# Patient Record
Sex: MALE | Race: WHITE | NOT HISPANIC OR LATINO | Employment: FULL TIME | URBAN - METROPOLITAN AREA
[De-identification: names, ages, dates, MRNs, and addresses within clinical notes are randomized per-mention and may not be internally consistent; named-entity substitution may affect disease eponyms.]

---

## 2017-12-11 ENCOUNTER — OFFICE VISIT (OUTPATIENT)
Dept: FAMILY MEDICINE | Facility: CLINIC | Age: 32
End: 2017-12-11
Payer: MEDICAID

## 2017-12-11 VITALS
TEMPERATURE: 96.6 F | BODY MASS INDEX: 30.21 KG/M2 | DIASTOLIC BLOOD PRESSURE: 77 MMHG | SYSTOLIC BLOOD PRESSURE: 118 MMHG | WEIGHT: 211 LBS | HEIGHT: 70 IN | OXYGEN SATURATION: 97 % | HEART RATE: 65 BPM

## 2017-12-11 DIAGNOSIS — Z00.00 ENCOUNTER FOR ROUTINE ADULT HEALTH EXAMINATION WITHOUT ABNORMAL FINDINGS: Primary | ICD-10-CM

## 2017-12-11 DIAGNOSIS — Z83.49 FAMILY HISTORY OF HYPERTHYROIDISM: ICD-10-CM

## 2017-12-11 DIAGNOSIS — Z11.3 SCREEN FOR STD (SEXUALLY TRANSMITTED DISEASE): ICD-10-CM

## 2017-12-11 LAB
ALBUMIN SERPL-MCNC: 4.2 G/DL (ref 3.4–5)
ALP SERPL-CCNC: 67 U/L (ref 40–150)
ALT SERPL W P-5'-P-CCNC: 28 U/L (ref 0–70)
ANION GAP SERPL CALCULATED.3IONS-SCNC: 10 MMOL/L (ref 3–14)
AST SERPL W P-5'-P-CCNC: 20 U/L (ref 0–45)
BILIRUB SERPL-MCNC: 0.6 MG/DL (ref 0.2–1.3)
BUN SERPL-MCNC: 14 MG/DL (ref 7–30)
CALCIUM SERPL-MCNC: 9 MG/DL (ref 8.5–10.1)
CHLORIDE SERPL-SCNC: 106 MMOL/L (ref 94–109)
CHOLEST SERPL-MCNC: 169 MG/DL
CO2 SERPL-SCNC: 23 MMOL/L (ref 20–32)
CREAT SERPL-MCNC: 0.93 MG/DL (ref 0.66–1.25)
ERYTHROCYTE [DISTWIDTH] IN BLOOD BY AUTOMATED COUNT: 13.8 % (ref 10–15)
GFR SERPL CREATININE-BSD FRML MDRD: >90 ML/MIN/1.7M2
GLUCOSE SERPL-MCNC: 96 MG/DL (ref 70–99)
HCT VFR BLD AUTO: 44.1 % (ref 40–53)
HDLC SERPL-MCNC: 58 MG/DL
HGB BLD-MCNC: 15.1 G/DL (ref 13.3–17.7)
HIV 1+2 AB+HIV1 P24 AG SERPL QL IA: NONREACTIVE
LDLC SERPL CALC-MCNC: 83 MG/DL
MCH RBC QN AUTO: 29.9 PG (ref 26.5–33)
MCHC RBC AUTO-ENTMCNC: 34.2 G/DL (ref 31.5–36.5)
MCV RBC AUTO: 87 FL (ref 78–100)
NONHDLC SERPL-MCNC: 111 MG/DL
PLATELET # BLD AUTO: 315 10E9/L (ref 150–450)
POTASSIUM SERPL-SCNC: 4.1 MMOL/L (ref 3.4–5.3)
PROT SERPL-MCNC: 7.8 G/DL (ref 6.8–8.8)
RBC # BLD AUTO: 5.05 10E12/L (ref 4.4–5.9)
SODIUM SERPL-SCNC: 139 MMOL/L (ref 133–144)
TRIGL SERPL-MCNC: 142 MG/DL
TSH SERPL DL<=0.005 MIU/L-ACNC: 0.76 MU/L (ref 0.4–4)
WBC # BLD AUTO: 7.5 10E9/L (ref 4–11)

## 2017-12-11 PROCEDURE — 87491 CHLMYD TRACH DNA AMP PROBE: CPT | Performed by: PHYSICIAN ASSISTANT

## 2017-12-11 PROCEDURE — 36415 COLL VENOUS BLD VENIPUNCTURE: CPT | Performed by: PHYSICIAN ASSISTANT

## 2017-12-11 PROCEDURE — 99385 PREV VISIT NEW AGE 18-39: CPT | Performed by: PHYSICIAN ASSISTANT

## 2017-12-11 PROCEDURE — 80053 COMPREHEN METABOLIC PANEL: CPT | Performed by: PHYSICIAN ASSISTANT

## 2017-12-11 PROCEDURE — 84443 ASSAY THYROID STIM HORMONE: CPT | Performed by: PHYSICIAN ASSISTANT

## 2017-12-11 PROCEDURE — 87389 HIV-1 AG W/HIV-1&-2 AB AG IA: CPT | Performed by: PHYSICIAN ASSISTANT

## 2017-12-11 PROCEDURE — 85027 COMPLETE CBC AUTOMATED: CPT | Performed by: PHYSICIAN ASSISTANT

## 2017-12-11 PROCEDURE — 80061 LIPID PANEL: CPT | Performed by: PHYSICIAN ASSISTANT

## 2017-12-11 PROCEDURE — 87591 N.GONORRHOEAE DNA AMP PROB: CPT | Performed by: PHYSICIAN ASSISTANT

## 2017-12-11 RX ORDER — MULTIPLE VITAMINS W/ MINERALS TAB 9MG-400MCG
1 TAB ORAL DAILY
COMMUNITY

## 2017-12-11 NOTE — NURSING NOTE
"Chief Complaint   Patient presents with     Physical       Initial /77 (Cuff Size: Adult Large)  Pulse 65  Temp 96.6  F (35.9  C) (Tympanic)  Ht 5' 10\" (1.778 m)  Wt 211 lb (95.7 kg)  SpO2 97%  BMI 30.28 kg/m2 Estimated body mass index is 30.28 kg/(m^2) as calculated from the following:    Height as of this encounter: 5' 10\" (1.778 m).    Weight as of this encounter: 211 lb (95.7 kg).  Medication Reconciliation: complete    "

## 2017-12-11 NOTE — MR AVS SNAPSHOT
After Visit Summary   12/11/2017    Suhas Hernandez    MRN: [id number]           Patient Information     Date Of Birth          1985        Visit Information        Provider Department      12/11/2017 9:30 AM Marge Brock PA-C [facility]        Today's Diagnoses     Family history of hyperthyroidism    -  1    Encounter for routine adult health examination without abnormal findings        Screen for STD (sexually transmitted disease)          Care Instructions      Preventive Health Recommendations  Male Ages 26 - 39    Yearly exam:             See your health care provider every year in order to  o   Review health changes.   o   Discuss preventive care.    o   Review your medicines if your doctor has prescribed any.    You should be tested each year for STDs (sexually transmitted diseases), if you re at risk.     After age 35, talk to your provider about cholesterol testing. If you are at risk for heart disease, have your cholesterol tested at least every 5 years.     If you are at risk for diabetes, you should have a diabetes test (fasting glucose).  Shots: Get a flu shot each year. Get a tetanus shot every 10 years.     Nutrition:    Eat at least 5 servings of fruits and vegetables daily.     Eat whole-grain bread, whole-wheat pasta and brown rice instead of white grains and rice.     Talk to your provider about Calcium and Vitamin D.     Lifestyle    Exercise for at least 150 minutes a week (30 minutes a day, 5 days a week). This will help you control your weight and prevent disease.     Limit alcohol to one drink per day.     No smoking.     Wear sunscreen to prevent skin cancer.     See your dentist every six months for an exam and cleaning.             Follow-ups after your visit        Who to contact     If you have questions or need follow up information about today's clinic visit or your schedule please contact [facility] [place] directly at [phone].  Normal or  "non-critical lab and imaging results will be communicated to you by MyChart, letter or phone within 4 business days after the clinic has received the results. If you do not hear from us within 7 days, please contact the clinic through [employer] or phone. If you have a critical or abnormal lab result, we will notify you by phone as soon as possible.  Submit refill requests through [employer] or call your pharmacy and they will forward the refill request to us. Please allow 3 business days for your refill to be completed.          Additional Information About Your Visit        YatedoharOMEGA MORGAN Information     [facility] gives you secure access to your electronic health record. If you see a primary care provider, you can also send messages to your care team and make appointments. If you have questions, please call your primary care clinic.  If you do not have a primary care provider, please call [phone] and they will assist you.        Care EveryWhere ID     This is your Care EveryWhere ID. This could be used by other organizations to access your [facility] medical records  [id number]        Your Vitals Were     Pulse Temperature Height Pulse Oximetry BMI (Body Mass Index)       65 96.6  F (35.9  C) (Tympanic) 5' 10\" (1.778 m) 97% 30.28 kg/m2        Blood Pressure from Last 3 Encounters:   12/11/17 118/77   08/22/13 122/70   03/26/13 134/83    Weight from Last 3 Encounters:   12/11/17 211 lb (95.7 kg)   08/22/13 211 lb (95.7 kg)   03/26/13 218 lb 3.2 oz (99 kg)              We Performed the Following     CBC with platelets     Chlamydia trachomatis PCR     Comprehensive metabolic panel     HIV Antigen Antibody Combo     Lipid Profile     Neisseria gonorrhoeae PCR     TSH with free T4 reflex        Primary Care Provider Office Phone # Fax #    Marge Brock PA-C [phone] [address]   [place] [address]        Equal Access to Services     TA BURNETT AH: Hadii codi Amor " rosio sahamaadam goodmanjasmyneprimo medinanilton khoileandro krishna. So [facility] [phone].    ATENCIÓN: Si shmuel hickman, tiene a shah disposición servicios gratuitos de asistencia lingüística. Llame al [phone].    We comply with applicable federal civil rights laws and [place] laws. We do not discriminate on the basis of race, color, national origin, age, disability, sex, sexual orientation, or gender identity.            Thank you!     Thank you for choosing [facility]  for your care. Our goal is always to provide you with excellent care. Hearing back from our patients is one way we can continue to improve our services. Please take a few minutes to complete the written survey that you may receive in the mail after your visit with us. Thank you!             Your Updated Medication List - Protect others around you: Learn how to safely use, store and throw away your medicines at www.disposemymeds.org.          This list is accurate as of: 12/11/17  9:46 AM.  Always use your most recent med list.                   Brand Name Dispense Instructions for use Diagnosis    Multi-vitamin Tabs tablet      Take 1 tablet by mouth daily

## 2017-12-11 NOTE — PROGRESS NOTES
Answers for HPI/ROS submitted by the patient on 12/10/2017     SUBJECTIVE:   CC: Suhas Hernandez is an 32 year old male who presents for preventative health visit.     Declines flu shot today    Annual Exam:  Getting at least 3 servings of Calcium per day:: Yes  Bi-annual eye exam:: Yes  Dental care twice a year:: Yes  Sleep apnea or symptoms of sleep apnea:: None  Diet:: Low salt, Low fat/cholesterol  Frequency of exercise:: 4-5 days/week  Taking medications regularly:: Yes  Medication side effects:: None  Additional concerns today:: YES  PHQ-2 Score: 0  Duration of exercise:: 45-60 minutes        Today's PHQ-2 Score:   PHQ-2 ( 1999 [employer]) 12/10/2017 8/22/2013   Q1: Little interest or pleasure in doing things 0 0   Q2: Feeling down, depressed or hopeless 0 0   PHQ-2 Score 0 0   Q1: Little interest or pleasure in doing things Not at all -   Q2: Feeling down, depressed or hopeless Not at all -   PHQ-2 Score 0 -         Abuse: Current or Past(Physical, Sexual or Emotional)- No  Do you feel safe in your environment - Yes  Social History   Substance Use Topics     Smoking status: Never Smoker     Smokeless tobacco: Never Used     Alcohol use Yes      Comment: 1-2 times a month     The patient does not drink >3 drinks per day nor >7 drinks per week.    Past Medical History:   Diagnosis Date     Anxiety      Family history of hyperthyroidism 3/12/2012     Glaucoma      PTSD (post-traumatic stress disorder) 11/2010    [employer] volunteer in [employer] [facility].     Sweat, sweating, excessive 3/12/2012     Past Surgical History:   Procedure Laterality Date     MYRINGOTOMY, INSERT TUBE BILATERAL, COMBINED       Current Outpatient Prescriptions   Medication Sig Dispense Refill     multivitamin, therapeutic with minerals (MULTI-VITAMIN) TABS tablet Take 1 tablet by mouth daily         ROS:  C: NEGATIVE for fever, chills, change in weight  I: NEGATIVE for worrisome rashes, moles or lesions  E: NEGATIVE for vision changes or irritation  ENT:  "NEGATIVE for ear, mouth and throat problems  R: NEGATIVE for significant cough or SOB  CV: NEGATIVE for chest pain, palpitations or peripheral edema  GI: NEGATIVE for nausea, abdominal pain, heartburn, or change in bowel habits   male: negative for dysuria, hematuria, decreased urinary stream, erectile dysfunction, urethral discharge  M: NEGATIVE for significant arthralgias or myalgia  N: NEGATIVE for weakness, dizziness or paresthesias  P: NEGATIVE for changes in mood or affect    OBJECTIVE:   /77 (Cuff Size: Adult Large)  Pulse 65  Temp 96.6  F (35.9  C) (Tympanic)  Ht 5' 10\" (1.778 m)  Wt 211 lb (95.7 kg)  SpO2 97%  BMI 30.28 kg/m2  EXAM:  GENERAL: healthy, alert and no distress  EYES: Eyes grossly normal to inspection, PERRL and conjunctivae and sclerae normal  HENT: ear canals and TM's normal, nose and mouth without ulcers or lesions  NECK: no adenopathy, no asymmetry, masses, or scars and thyroid normal to palpation  RESP: lungs clear to auscultation - no rales, rhonchi or wheezes  CV: regular rate and rhythm, normal S1 S2, no S3 or S4, no murmur, click or rub, no peripheral edema and peripheral pulses strong  ABDOMEN: soft, nontender, no hepatosplenomegaly, no masses and bowel sounds normal  : no penile lesions. Testicular exam normal.  MS: no gross musculoskeletal defects noted, no edema  SKIN: no suspicious lesions or rashes  NEURO: Normal strength and tone, mentation intact and speech normal  PSYCH: mentation appears normal, affect normal/bright    ASSESSMENT/PLAN:   Assessment and Plan:     (Z83.49) Family history of hyperthyroidism  (primary encounter diagnosis)  Comment:   Plan: TSH with free T4 reflex            (Z00.00) Encounter for routine adult health examination without abnormal findings  Comment:   Plan: Comprehensive metabolic panel, CBC with         platelets, Lipid Profile            (Z11.3) Screen for STD (sexually transmitted disease)  Comment:   Plan: HIV Antigen Antibody " "Combo, Chlamydia         trachomatis PCR, Neisseria gonorrhoeae PCR                COUNSELING:  Reviewed preventive health counseling, as reflected in patient instructions         reports that he has never smoked. He has never used smokeless tobacco.      Estimated body mass index is 30.28 kg/(m^2) as calculated from the following:    Height as of this encounter: 5' 10\" (1.778 m).    Weight as of this encounter: 211 lb (95.7 kg).       Counseling Resources:  ATP IV Guidelines  Pooled Cohorts Equation Calculator  FRAX Risk Assessment  ICSI Preventive Guidelines  Dietary Guidelines for Americans, 2010  USDA's MyPlate  ASA Prophylaxis  Lung CA Screening    Marge Brock PA-C  [facility]  "

## 2017-12-12 LAB
C TRACH DNA SPEC QL NAA+PROBE: NEGATIVE
N GONORRHOEA DNA SPEC QL NAA+PROBE: NEGATIVE
SPECIMEN SOURCE: NORMAL
SPECIMEN SOURCE: NORMAL

## 2017-12-12 NOTE — PROGRESS NOTES
It was a pleasure seeing you for your physical examination.  I wanted to get back to you with your test results.  I have enclosed a copy for your review.      I am happy to report that your CBC or complete blood count is normal with no signs of anemia, leukemia or platelet abnormalities. Your chemistry panel shows no signs of diabetes.  Your blood salts, kidney tests, liver tests, and proteins are all fine.    Your thyroid test was in normal range.  Your HIV, gonorrhea and chlamydia tests were all negative.    Your total cholesterol is 169 with the normal range being below 200.  Your HDL or good cholesterol is 58 with the normal range being above 40.  Your LDL or bad cholesterol is 83 with the normal range being below 160.  This looks great!    Let me know if you have any questions.    Marge Brock PA-C

## 2018-09-24 ENCOUNTER — OFFICE VISIT (OUTPATIENT)
Dept: NUTRITION | Facility: CLINIC | Age: 33
End: 2018-09-24
Payer: COMMERCIAL

## 2018-09-24 VITALS — HEIGHT: 68 IN | WEIGHT: 225.1 LBS | BODY MASS INDEX: 34.11 KG/M2

## 2018-09-24 DIAGNOSIS — K21.9 GASTROESOPHAGEAL REFLUX DISEASE, ESOPHAGITIS PRESENCE NOT SPECIFIED: ICD-10-CM

## 2018-09-24 DIAGNOSIS — E66.811 CLASS 1 OBESITY WITHOUT SERIOUS COMORBIDITY WITH BODY MASS INDEX (BMI) OF 34.0 TO 34.9 IN ADULT, UNSPECIFIED OBESITY TYPE: Primary | ICD-10-CM

## 2018-09-24 PROCEDURE — 97802 MEDICAL NUTRITION INDIV IN: CPT

## 2018-09-24 NOTE — PATIENT INSTRUCTIONS
Reminders:  1) BMI of 24 = body weight of ~ 160#  2) Consider tracking calorie intake for awhile like myfitnesspal  3) Check A1C  4) Consider the plant based meal plan for 3-4 weeks

## 2018-09-24 NOTE — MR AVS SNAPSHOT
After Visit Summary   9/24/2018    Suhas Hernandez    MRN: [id number]           Patient Information     Date Of Birth          1985        Visit Information        Provider Department      9/24/2018 2:30 PM  [facility]        Care Instructions    Reminders:  1) BMI of 24 = body weight of ~ 160#  2) Consider tracking calorie intake for awhile like myfitnesspal  3) Check A1C  4) Consider the plant based meal plan for 3-4 weeks            Follow-ups after your visit        Your next 10 appointments already scheduled     Oct 22, 2018  2:30 PM CDT   Office Visit with [facility] ([facility])    [address] [address] [phone]           Bring a current list of meds and any records pertaining to this visit. For Physicals, please bring immunization records and any forms needing to be filled out. Please arrive 10 minutes early to complete paperwork.              Who to contact     If you have questions or need follow up information about today's clinic visit or your schedule please contact [facility] directly at [phone].  Normal or non-critical lab and imaging results will be communicated to you by MyChart, letter or phone within 4 business days after the clinic has received the results. If you do not hear from us within 7 days, please contact the clinic through [employer] or phone. If you have a critical or abnormal lab result, we will notify you by phone as soon as possible.  Submit refill requests through [employer] or call your pharmacy and they will forward the refill request to us. Please allow 3 business days for your refill to be completed.          Additional Information About Your Visit        MyChart Information     [employer] gives you secure access to your electronic health record. If you see a primary care provider, you can  also send messages to your care team and make appointments. If you have questions, please call your primary care clinic.  If you do not have a primary care provider, please call [phone] and they will assist you.        Care EveryWhere ID     This is your Care EveryWhere ID. This could be used by other organizations to access your [facility] medical records  [id number]         Blood Pressure from Last 3 Encounters:   12/11/17 118/77   08/22/13 122/70   03/26/13 134/83    Weight from Last 3 Encounters:   12/11/17 95.7 kg (211 lb)   08/22/13 95.7 kg (211 lb)   03/26/13 99 kg (218 lb 3.2 oz)              Today, you had the following     No orders found for display       Primary Care Provider Office Phone # Fax #    Marge Brock PA-C [phone] [address]   [place] [address]        Equal Access to Services     [facility]: Hadii aad ku hadasho Soomaali, waaxda luqadaha, qaybta kaalmada adeegyada, waxay idiin hayaan adeeg londonaravinay la'yang . So [facility] [phone].    ATENCIÓN: Si habla español, tiene a shah disposición servicios gratuitos de asistencia lingüística. Llame al [phone].    We comply with applicable federal civil rights laws and [place] laws. We do not discriminate on the basis of race, color, national origin, age, disability, sex, sexual orientation, or gender identity.            Thank you!     Thank you for choosing [facility]  for your care. Our goal is always to provide you with excellent care. Hearing back from our patients is one way we can continue to improve our services. Please take a few minutes to complete the written survey that you may receive in the mail after your visit with us. Thank you!             Your Updated Medication List - Protect others around you: Learn how to safely use, store and throw away your medicines at www.disposemymeds.org.          This list is accurate as of 9/24/18  4:03 PM.  Always use your most recent  med list.                   Brand Name Dispense Instructions for use Diagnosis    Multi-vitamin Tabs tablet      Take 1 tablet by mouth daily

## 2018-09-24 NOTE — PROGRESS NOTES
"Medical Nutrition Therapy  Visit Type:Initial assessment and intervention    Suhas Hernandez presents today for MNT and education related to weight management and wants diabetes prevention with family history on both of sides of family in grandparents. No diabetes in parents yet. Mom is a triathlete (she has hypothyroid). Both parents healthy weight. Mom sometimes up and down with weight.  He is accompanied by self.     ASSESSMENT:   Patient comments/concerns relating to nutrition: aware of risk of DB with family history, overweight. Has been to  for 1.5 years to improve health- and has lost #15 in that time, not satisfied with loss. Wonders if something is missing, is he overlooking something.   Questions- goal weight? 160# for BMI of 24, not overweight. Discussed realistic goal, move left on the BMI chart.   BMI 34 today.   Could I have food allergies (that cause weight gain or difficulty losing)? Probably would know from sx    NUTRITION HISTORY:  Angelo finds eating sugar to be \"agitating\", makes him uncomfortable. Rarely has cheese or dairy, uses almond milk. Reports some dairy intolerance.  \"I try to eat as little carb as possible, be smart about portion size\" Not eating \"extracurricular sugar\".  Keeps food record at times for .  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  Kind of eats by the clock, [place] when there was a tight schedule.Results in eating when not hungry.           Beverages: Water 100 oz/day water, coffee with cream and agave or stevia     Misses meals? no  Eats out:  2 meals/per week     Previous diet education:  No     Food allergies/intolerances: dairy intolerance by report    Diet does not appear excessive in calories, appears well balanced with produce, protein, starch at most meals.    EXERCISE: moderate regular exercise program which includes personal training 3 days per week: individual and class " sessions. Mostly weight training, resistance.  He feels stronger, but not much change in fat %.    SOCIO/ECONOMIC:   Lives with: self    MEDICATIONS:  Current Outpatient Prescriptions   Medication     multivitamin, therapeutic with minerals (MULTI-VITAMIN) TABS tablet     No current facility-administered medications for this visit.      Multi contains 800 international unit  Vitamin D3    LABS:  Last Basic Metabolic Panel:  Lab Results   Component Value Date     12/11/2017      Lab Results   Component Value Date    POTASSIUM 4.1 12/11/2017     Lab Results   Component Value Date    CHLORIDE 106 12/11/2017     Lab Results   Component Value Date    LEONIDAS 9.0 12/11/2017     Lab Results   Component Value Date    CO2 23 12/11/2017     Lab Results   Component Value Date    BUN 14 12/11/2017     Lab Results   Component Value Date    CR 0.93 12/11/2017     Lab Results   Component Value Date    GLC 96 12/11/2017       ANTHROPOMETRICS:  Vitals: There were no vitals taken for this visit.  There is no height or weight on file to calculate BMI.      Wt Readings from Last 5 Encounters:   12/11/17 95.7 kg (211 lb)   08/22/13 95.7 kg (211 lb)   03/26/13 99 kg (218 lb 3.2 oz)   03/07/13 97.2 kg (214 lb 4.8 oz)   03/12/12 101.9 kg (224 lb 11.2 oz)       Weight Change: reports loss of 15# in 1.5 years, down from 240#    ESTIMATED KCAL REQUIREMENTS:  Not calculated today     NUTRITION DIAGNOSIS: Overweight/Obesity related to dietary choices, perhaps some insulin resistance as evidenced by weight loss of < 1#/month in past 1.5 years despite purposeful activity and healthful eating    NUTRITION INTERVENTION:  Education given to support: weight reduction: Label Reading reviewed for finding fat grams, use of weight loss markus to track intake and make adjustments, and plant based eating as a strategy.  Angelo expresses he is very interested in trying a plant-based approach. Instructed on research around low-fast vegan meal plan and daily  "diet, also variations on plant-based like VB6  Education Materials Provided: PCRM graphic, \"How To\" guide for plant based eating, 21 day vegan Allison aguero markus, [employer] veg starter kit    PATIENT'S BEHAVIOR CHANGE GOALS:   See Patient Instructions for patient stated behavior change goals. AVS was printed and given to patient at today's appointment.    MONITOR / EVALUATE:  RD will monitor/evaluate:  Pertinent Labs  Progress toward meeting stated nutrition-related goals  Weight change    FOLLOW-UP:  Call RD with questions/concerns.   Follow-up appointment scheduled on 10/22.     Rashmi Ford RD, LD, CDE    Time spent in minutes: 75  Encounter: Individual Nutrition    ADDEND:  Reply from provider:      A1C ordered per PCP approval.   Alerted Suhas that he may come in for lab.    Rashmi Ford RD, LD, CDE    "

## 2018-09-27 ENCOUNTER — PATIENT OUTREACH (OUTPATIENT)
Dept: EDUCATION SERVICES | Facility: CLINIC | Age: 33
End: 2018-09-27

## 2018-09-27 DIAGNOSIS — E66.9 OBESITY: Primary | ICD-10-CM

## 2018-09-27 NOTE — PROGRESS NOTES
Email from Suhas:  Ramón Borrero,     Thanks so much for providing me with information regarding a plant-based diet and   how it can help me reach my goals.   I am happy to report that I am scoring a 100% success following the diet. No meat, dairy or other animal-based  product. The hardest part has been skipping creamer in my coffee. It will take a while to get used to that.   I'm finding that sparkling water like [place] or water with fresh lemon juice might be a good substitute if   I can't get over the almond milk creamer.     I'm wondering if the A1C test was scheduled? I am traveling right now, so I am hoping that I can come in next Thursday, Oct. 4 in the afternoon for   the test?  Thanks again.     --   Suhas Hernandez    CDE reply:  Ramón Dai,     Wow, good for you! It s great to hear you are giving the plant-based strategy a try to see if that works for you J   The plant-based creamers can be tricky, not quite as thick as the real thing. You are right on track thinking about alternatives or other options!    Yes, I did hear back from Marge and she was on board with the A1C, so that is now ordered and you can come in any time to check. Again, it is not a fasting lab so any time of day is fine.    I m excited to hear what you think about the meal plan when we reconnect!   Please do let me know if particular questions come up before the 22nd.    Rashmi Ford RD, LD, CDE

## 2018-10-05 DIAGNOSIS — E66.811 CLASS 1 OBESITY WITHOUT SERIOUS COMORBIDITY WITH BODY MASS INDEX (BMI) OF 34.0 TO 34.9 IN ADULT, UNSPECIFIED OBESITY TYPE: ICD-10-CM

## 2018-10-05 LAB — HBA1C MFR BLD: 5.5 % (ref 0–5.6)

## 2018-10-05 PROCEDURE — 36415 COLL VENOUS BLD VENIPUNCTURE: CPT | Performed by: PHYSICIAN ASSISTANT

## 2018-10-05 PROCEDURE — 83036 HEMOGLOBIN GLYCOSYLATED A1C: CPT | Performed by: PHYSICIAN ASSISTANT

## 2018-10-08 ENCOUNTER — PATIENT OUTREACH (OUTPATIENT)
Dept: EDUCATION SERVICES | Facility: CLINIC | Age: 33
End: 2018-10-08

## 2018-10-08 NOTE — PROGRESS NOTES
Suhas emailed:    Ramón Borrero,     I wanted to let you know I had my A1C test last Friday, 10/5.     Also, one last question about the vegan diet. I have noticed that my appetite has decreased significantly  since starting this diet. Is that normal?    RD reply:  Hi there,   Thanks for letting me know! I see the A1C result at 5.5%, the diagnostic for pre-diabetes is 5.7%, so you are in a good position to work on reducing that ;)     Regarding the appetite, it is not unusual to feel less hungry if your meal plan is higher in fiber than before.  It s fairly typical on the plant-based meal plan where you might be eating more fruit, veggies, whole grains, and beans- all quite high in fiber.   You were already doing well with eating the produce and some whole grains, but the meats, cheese, egg that were part of your diet before don t have any fiber, so replacing them with beans, lentils, some of the veggie protein with fiber can be more filling. That is believed to be one of the factors that results in weight loss or lower body weight in those eating plant-based.     That s the first thing that comes to mind, the fiber connection. We spoke a little before about  what your body likes or wants , sometimes it s not so much about how much you eat as what you eat.  Perhaps it s just more satisfying for you?    It would be great if you can keep a 3-4 day food record again before we meet up on the 22nd just to make sure you are getting enough calories and good balance of nutrients too J     I m excited to hear what you think and re-connect!  I will be out of town next week, but please be in touch if you do have other questions and one of the others on the team can help out.     [place],   Gissell Ford RD, LD, CDE

## 2018-10-22 ENCOUNTER — OFFICE VISIT (OUTPATIENT)
Dept: NUTRITION | Facility: CLINIC | Age: 33
End: 2018-10-22
Payer: COMMERCIAL

## 2018-10-22 VITALS — BODY MASS INDEX: 33.01 KG/M2 | WEIGHT: 217.1 LBS

## 2018-10-22 DIAGNOSIS — E66.9 OBESITY: Primary | ICD-10-CM

## 2018-10-22 PROCEDURE — 97803 MED NUTRITION INDIV SUBSEQ: CPT

## 2018-10-22 NOTE — PATIENT INSTRUCTIONS
Reminders:  1) Aim for 60-70 grams protein/day  2) Remember to choose some protein at each meal  3) keep taking the vitamin daily

## 2018-10-22 NOTE — MR AVS SNAPSHOT
After Visit Summary   10/22/2018    Suhas Hernandez    MRN: [id number]           Patient Information     Date Of Birth          1985        Visit Information        Provider Department      10/22/2018 2:30 PM  NUTRITION [facility]        Care Instructions    Reminders:  1) Aim for 60-70 grams protein/day  2) Remember to choose some protein at each meal  3) keep taking the vitamin daily            Follow-ups after your visit        Your next 10 appointments already scheduled     Nov 20, 2018  2:30 PM CST   Office Visit with [facility] ([facility])    [address] [id number] [phone]           Bring a current list of meds and any records pertaining to this visit. For Physicals, please bring immunization records and any forms needing to be filled out. Please arrive 10 minutes early to complete paperwork.              Who to contact     If you have questions or need follow up information about today's clinic visit or your schedule please contact [facility] directly at [phone].  Normal or non-critical lab and imaging results will be communicated to you by viviohart, letter or phone within 4 business days after the clinic has received the results. If you do not hear from us within 7 days, please contact the clinic through Pharmlyt or phone. If you have a critical or abnormal lab result, we will notify you by phone as soon as possible.  Submit refill requests through basno or call your pharmacy and they will forward the refill request to us. Please allow 3 business days for your refill to be completed.          Additional Information About Your Visit        viviohart Information     basno gives you secure access to your electronic health record. If you see a primary care provider, you can also send messages to your care team and make  appointments. If you have questions, please call your primary care clinic.  If you do not have a primary care provider, please call [phone] and they will assist you.        Care EveryWhere ID     This is your Care EveryWhere ID. This could be used by other organizations to access your [place] medical records  [id number]         Blood Pressure from Last 3 Encounters:   12/11/17 118/77   08/22/13 122/70   03/26/13 134/83    Weight from Last 3 Encounters:   09/24/18 102.1 kg (225 lb 1.6 oz)   12/11/17 95.7 kg (211 lb)   08/22/13 95.7 kg (211 lb)              Today, you had the following     No orders found for display       Primary Care Provider Office Phone # Fax #    Marge Brock PA-C [phone] [address]   [place] [address]        Equal Access to Services     PORTIA BURNETT : Hadii aad ku hadasho Soomaali, waaxda luqadaha, qaybta kaalmada adeegyada, waxay khoiin haymaryannn rex borja . So [facility] [phone].    ATENCIÓN: Si habla español, tiene a shah disposición servicios gratuitos de asistencia lingüística. Llame al [phone].    We comply with applicable federal civil rights laws and [place] laws. We do not discriminate on the basis of race, color, national origin, age, disability, sex, sexual orientation, or gender identity.            Thank you!     Thank you for choosing [facility]  for your care. Our goal is always to provide you with excellent care. Hearing back from our patients is one way we can continue to improve our services. Please take a few minutes to complete the written survey that you may receive in the mail after your visit with us. Thank you!             Your Updated Medication List - Protect others around you: Learn how to safely use, store and throw away your medicines at www.disposemymeds.org.          This list is accurate as of 10/22/18  3:14 PM.  Always use your most recent med list.                   Brand Name  Dispense Instructions for use Diagnosis    Multi-vitamin Tabs tablet      Take 1 tablet by mouth daily

## 2018-10-22 NOTE — PROGRESS NOTES
"Medical Nutrition Therapy  Visit Type:Reassessment and intervention    Suhas Hernandez presents today for MNT and education related to weight management and diabetes prevention.   He is accompanied by self.     ASSESSMENT:   Patient comments/concerns relating to nutrition: Suhas reports a strong family history of T2D and he would like to avoid diabetes. He has been working out for 1.5 years and lost 15# in effort to improve health.  A1C of 10/05/18= 5.5%  Discussed weight loss strategies at last visit 1 month ago and Suhas has chosen to follow a low-fat plant-based meal plan (recommended for diabetes treatment).   He has lot 7# in the past month on a low-fat vegan diet. BMI now at 33.   Suhas describes it has been tricky to avoid all dairy, \"it's in everything\" but doing very well. Says he is following \"95% vegan\"- occasionally gets a salad that has some shredded cheese on it.   He describes that he has always been in the habit of eating the same foods, \"I don't every crave something\".   Reports decreased appetite on the plant-based plan, just not as hungry and food seems to last longer. Reviewed impact of higher fiber eating.   He continues to experiment with different foods, had an \"impossible burger\" recently and liked it well. He travels a lot, but has done well finding options he likes dining out.     NUTRITION HISTORY:  2 days of food records show inconsistent protein intake.   Breakfast: almost always oatmeal and fruit with coffee- almond creamer (Or Luis granola bar and fruit OR bagel and PB rarely)  Lunch: salad, veggies and wheat croutons with fat free dressing +/- lentil soup  Dinner: pad Tajik with tofu OR wheat pasta with marinara   Snacks: fruit cup or nuts or chips and salsa    Beverages: Coffee 1/day and Water 90 oz/day    Misses meals? no  Eats out:  A few meals/per week     Previous diet education:  Yes on 9/24 at initial visit    Food allergies/intolerances: dairy by report    EXERCISE: " moderate regular exercise program, see prior note    SOCIO/ECONOMIC:   Lives with: self    MEDICATIONS:  Current Outpatient Prescriptions   Medication     multivitamin, therapeutic with minerals (MULTI-VITAMIN) TABS tablet     No current facility-administered medications for this visit.        LABS:  Last Basic Metabolic Panel:  Lab Results   Component Value Date     12/11/2017      Lab Results   Component Value Date    POTASSIUM 4.1 12/11/2017     Lab Results   Component Value Date    CHLORIDE 106 12/11/2017     Lab Results   Component Value Date    LEONIDAS 9.0 12/11/2017     Lab Results   Component Value Date    CO2 23 12/11/2017     Lab Results   Component Value Date    BUN 14 12/11/2017     Lab Results   Component Value Date    CR 0.93 12/11/2017     Lab Results   Component Value Date    GLC 96 12/11/2017       ANTHROPOMETRICS:  Vitals: Wt 98.5 kg (217 lb 1.6 oz)  BMI 33.01 kg/m2  There is no height or weight on file to calculate BMI.      Wt Readings from Last 5 Encounters:   09/24/18 102.1 kg (225 lb 1.6 oz)   12/11/17 95.7 kg (211 lb)   08/22/13 95.7 kg (211 lb)   03/26/13 99 kg (218 lb 3.2 oz)   03/07/13 97.2 kg (214 lb 4.8 oz)       Weight Change: 7# loss    NUTRITION DIAGNOSIS: Overweight/Obesity related to previous food choices/intake as evidenced by 7# weight loss on plant-based diet    NUTRITION INTERVENTION:  Education given to support: low-fat plant-based meal plan and getting adequate protein of 60-70 grams/day   Education Materials Provided: list of plant protein foods    PATIENT'S BEHAVIOR CHANGE GOALS:   See Patient Instructions for patient stated behavior change goals. AVS was printed and given to patient at today's appointment.    MONITOR / EVALUATE:  RD will monitor/evaluate:  Progress toward meeting stated nutrition-related goals  Weight change    FOLLOW-UP:  Follow-up appointment scheduled on 11/19.     Rashmi Fodr RD, LD, CDE    Time spent in minutes: 25  Encounter: Individual

## 2018-12-15 ASSESSMENT — PATIENT HEALTH QUESTIONNAIRE - PHQ9
SUM OF ALL RESPONSES TO PHQ QUESTIONS 1-9: 10
SUM OF ALL RESPONSES TO PHQ QUESTIONS 1-9: 10
10. IF YOU CHECKED OFF ANY PROBLEMS, HOW DIFFICULT HAVE THESE PROBLEMS MADE IT FOR YOU TO DO YOUR WORK, TAKE CARE OF THINGS AT HOME, OR GET ALONG WITH OTHER PEOPLE: VERY DIFFICULT

## 2018-12-17 ENCOUNTER — PATIENT OUTREACH (OUTPATIENT)
Dept: EDUCATION SERVICES | Facility: CLINIC | Age: 33
End: 2018-12-17

## 2018-12-17 DIAGNOSIS — E66.9 OBESITY: Primary | ICD-10-CM

## 2018-12-17 NOTE — PROGRESS NOTES
"Reached out to Suhas, his most recent appointment had to be cancelled and he has not rescheduled. He has been \"travelling about 80% of the last month\" and not able to come in.    He says things are going well with the plant-based meal plan, \"I'm sticking with it\" and tells me his weight is now 212#, down 5 more from when we last connected.     He is pleased, but does describe some decreased energy at the gym, says maybe he needs to cut down a little to shorter workouts.     Reviewed (as discussed at last visit) be sure to get enough protein on the balanced plate, he feels he is doing well with that.   He does continue to take daily vitamins.     Suhas is scheduled for a physical later this week, will let me know if needs arise that I can assist with.    Rashmi Ford RD, LD, CDE    "

## 2018-12-20 ENCOUNTER — OFFICE VISIT (OUTPATIENT)
Dept: FAMILY MEDICINE | Facility: CLINIC | Age: 33
End: 2018-12-20
Payer: COMMERCIAL

## 2018-12-20 VITALS
BODY MASS INDEX: 32.89 KG/M2 | HEART RATE: 81 BPM | HEIGHT: 68 IN | DIASTOLIC BLOOD PRESSURE: 80 MMHG | OXYGEN SATURATION: 95 % | TEMPERATURE: 97.4 F | SYSTOLIC BLOOD PRESSURE: 113 MMHG | WEIGHT: 217 LBS

## 2018-12-20 DIAGNOSIS — Z23 NEED FOR VACCINATION: ICD-10-CM

## 2018-12-20 DIAGNOSIS — Z00.00 ROUTINE GENERAL MEDICAL EXAMINATION AT A HEALTH CARE FACILITY: Primary | ICD-10-CM

## 2018-12-20 DIAGNOSIS — Z11.3 SCREEN FOR STD (SEXUALLY TRANSMITTED DISEASE): ICD-10-CM

## 2018-12-20 DIAGNOSIS — F41.9 ANXIETY: ICD-10-CM

## 2018-12-20 DIAGNOSIS — Z23 NEED FOR PROPHYLACTIC VACCINATION AND INOCULATION AGAINST INFLUENZA: ICD-10-CM

## 2018-12-20 DIAGNOSIS — F32.0 MILD MAJOR DEPRESSION (H): ICD-10-CM

## 2018-12-20 LAB
ERYTHROCYTE [DISTWIDTH] IN BLOOD BY AUTOMATED COUNT: 13.6 % (ref 10–15)
HCT VFR BLD AUTO: 43.7 % (ref 40–53)
HGB BLD-MCNC: 14.6 G/DL (ref 13.3–17.7)
MCH RBC QN AUTO: 29.2 PG (ref 26.5–33)
MCHC RBC AUTO-ENTMCNC: 33.4 G/DL (ref 31.5–36.5)
MCV RBC AUTO: 87 FL (ref 78–100)
PLATELET # BLD AUTO: 336 10E9/L (ref 150–450)
RBC # BLD AUTO: 5 10E12/L (ref 4.4–5.9)
WBC # BLD AUTO: 6.9 10E9/L (ref 4–11)

## 2018-12-20 PROCEDURE — 90714 TD VACC NO PRESV 7 YRS+ IM: CPT | Performed by: PHYSICIAN ASSISTANT

## 2018-12-20 PROCEDURE — 87389 HIV-1 AG W/HIV-1&-2 AB AG IA: CPT | Performed by: PHYSICIAN ASSISTANT

## 2018-12-20 PROCEDURE — 99395 PREV VISIT EST AGE 18-39: CPT | Mod: 25 | Performed by: PHYSICIAN ASSISTANT

## 2018-12-20 PROCEDURE — 90471 IMMUNIZATION ADMIN: CPT | Performed by: PHYSICIAN ASSISTANT

## 2018-12-20 PROCEDURE — 90686 IIV4 VACC NO PRSV 0.5 ML IM: CPT | Performed by: PHYSICIAN ASSISTANT

## 2018-12-20 PROCEDURE — 36415 COLL VENOUS BLD VENIPUNCTURE: CPT | Performed by: PHYSICIAN ASSISTANT

## 2018-12-20 PROCEDURE — 80053 COMPREHEN METABOLIC PANEL: CPT | Performed by: PHYSICIAN ASSISTANT

## 2018-12-20 PROCEDURE — 85027 COMPLETE CBC AUTOMATED: CPT | Performed by: PHYSICIAN ASSISTANT

## 2018-12-20 PROCEDURE — 90472 IMMUNIZATION ADMIN EACH ADD: CPT | Performed by: PHYSICIAN ASSISTANT

## 2018-12-20 RX ORDER — FAMOTIDINE 20 MG
TABLET ORAL DAILY
COMMUNITY
End: 2024-07-25

## 2018-12-20 ASSESSMENT — ANXIETY QUESTIONNAIRES
1. FEELING NERVOUS, ANXIOUS, OR ON EDGE: MORE THAN HALF THE DAYS
GAD7 TOTAL SCORE: 14
5. BEING SO RESTLESS THAT IT IS HARD TO SIT STILL: MORE THAN HALF THE DAYS
2. NOT BEING ABLE TO STOP OR CONTROL WORRYING: MORE THAN HALF THE DAYS
3. WORRYING TOO MUCH ABOUT DIFFERENT THINGS: MORE THAN HALF THE DAYS
7. FEELING AFRAID AS IF SOMETHING AWFUL MIGHT HAPPEN: MORE THAN HALF THE DAYS
6. BECOMING EASILY ANNOYED OR IRRITABLE: MORE THAN HALF THE DAYS
IF YOU CHECKED OFF ANY PROBLEMS ON THIS QUESTIONNAIRE, HOW DIFFICULT HAVE THESE PROBLEMS MADE IT FOR YOU TO DO YOUR WORK, TAKE CARE OF THINGS AT HOME, OR GET ALONG WITH OTHER PEOPLE: VERY DIFFICULT

## 2018-12-20 ASSESSMENT — PATIENT HEALTH QUESTIONNAIRE - PHQ9: 5. POOR APPETITE OR OVEREATING: MORE THAN HALF THE DAYS

## 2018-12-20 ASSESSMENT — MIFFLIN-ST. JEOR: SCORE: 1908.81

## 2018-12-20 NOTE — PROGRESS NOTES
Injectable Influenza Immunization Documentation    1.  Is the person to be vaccinated sick today?   No    2. Does the person to be vaccinated have an allergy to a component   of the vaccine?   No  Egg Allergy Algorithm Link    3. Has the person to be vaccinated ever had a serious reaction   to influenza vaccine in the past?   No    4. Has the person to be vaccinated ever had Guillain-Barré syndrome?   No    Form completed by patient    Prior to injection, verified patient identity using patient's name and date of birth.  Due to injection administration, patient instructed to remain in clinic for 15 minutes  afterwards, and to report any adverse reaction to me immediately.    Hortensia Du MA       Screening Questionnaire for Adult Immunization    Are you sick today?   No   Do you have allergies to medications, food, a vaccine component or latex?   No   Have you ever had a serious reaction after receiving a vaccination?   No   Do you have a long-term health problem with heart disease, lung disease, asthma, kidney disease, metabolic disease (e.g. diabetes), anemia, or other blood disorder?   No   Do you have cancer, leukemia, HIV/AIDS, or any other immune system problem?   No   In the past 3 months, have you taken medications that affect  your immune system, such as prednisone, other steroids, or anticancer drugs; drugs for the treatment of rheumatoid arthritis, Crohn s disease, or psoriasis; or have you had radiation treatments?   No   Have you had a seizure, or a brain or other nervous system problem?   No   During the past year, have you received a transfusion of blood or blood     products, or been given immune (gamma) globulin or antiviral drug?   No   For women: Are you pregnant or is there a chance you could become        pregnant during the next month?   No   Have you received any vaccinations in the past 4 weeks?   No     Immunization questionnaire answers were all negative.        Per orders of Marge Brock,  injection of Td given by Hortensia Du. Patient instructed to remain in clinic for 15 minutes afterwards, and to report any adverse reaction to me immediately.  Prior to injection, verified patient identity using patient's name and date of birth.  Due to injection administration, patient instructed to remain in clinic for 15 minutes  afterwards, and to report any adverse reaction to me immediately.         Screening performed by Hortensia Du on 12/20/2018 at 3:44 PM.

## 2018-12-20 NOTE — PATIENT INSTRUCTIONS
Preventive Health Recommendations  Male Ages 26 - 39    Yearly exam:             See your health care provider every year in order to  o   Review health changes.   o   Discuss preventive care.    o   Review your medicines if your doctor has prescribed any.    You should be tested each year for STDs (sexually transmitted diseases), if you re at risk.     After age 35, talk to your provider about cholesterol testing. If you are at risk for heart disease, have your cholesterol tested at least every 5 years.     If you are at risk for diabetes, you should have a diabetes test (fasting glucose).  Shots: Get a flu shot each year. Get a tetanus shot every 10 years.     Nutrition:    Eat at least 5 servings of fruits and vegetables daily.     Eat whole-grain bread, whole-wheat pasta and brown rice instead of white grains and rice.     Get adequate Calcium and Vitamin D.     Lifestyle    Exercise for at least 150 minutes a week (30 minutes a day, 5 days a week). This will help you control your weight and prevent disease.     Limit alcohol to one drink per day.     No smoking.     Wear sunscreen to prevent skin cancer.     See your dentist every six months for an exam and cleaning.     Zoloft start with 25mg (1/2 tab) for the first week, then increase to 50mg daily  Take in the morning not at night  Follow up in 6-8 weeks

## 2018-12-20 NOTE — PROGRESS NOTES
"  SUBJECTIVE:   CC: Suhas Hernandez is an 33 year old male who presents for preventive health visit.     Angelo states he has been anxious for \"his whole life\" but in the past 2 years has felt more depressed.  Depression runs in both of his parents and he is interested in trying medication at this point.  He is open to seeing a counselor as well.  No particular triggers    Healthy Habits:  Annual Exam:  Frequency of exercise:: 2-3 days/week  Getting at least 3 servings of Calcium per day:: Yes  Diet:: Low fat/cholesterol  Taking medications regularly:: Yes  Medication side effects:: Not applicable  Bi-annual eye exam:: Yes  Dental care twice a year:: Yes  Sleep apnea or symptoms of sleep apnea:: None  Additional concerns today:: Yes  Duration of exercise:: 30-45 minutes  If you checked off any problems, how difficult have these problems made it for you to do your work, take care of things at home, or get along with other people?: Very difficult  PHQ9 TOTAL SCORE: 10    Today's PHQ-2 Score:   PHQ-2 ( 1999 Pf[employer]) 12/20/2018 12/15/2018   Q1: Little interest or pleasure in doing things 1 2   Q2: Feeling down, depressed or hopeless 1 1   PHQ-2 Score 2 3   Q1: Little interest or pleasure in doing things - More than half the days   Q2: Feeling down, depressed or hopeless - Several days   PHQ-2 Score - 3       Abuse: Current or Past(Physical, Sexual or Emotional)- No  Do you feel safe in your environment? Yes    Social History     Tobacco Use     Smoking status: Never Smoker     Smokeless tobacco: Never Used   Substance Use Topics     Alcohol use: Yes     Comment: 1-2 times a month     If you drink alcohol do you typically have >3 drinks per day or >7 drinks per week? No                       Reviewed orders with patient. Reviewed health maintenance and updated orders accordingly - Yes    Past Medical History:   Diagnosis Date     Anxiety      Family history of hyperthyroidism 3/12/2012     Glaucoma      PTSD (post-traumatic " "stress disorder) 11/2010    [employer] volunteer in [facility].     Sweat, sweating, excessive 3/12/2012     Past Surgical History:   Procedure Laterality Date     MYRINGOTOMY, INSERT TUBE BILATERAL, COMBINED       Current Outpatient Medications   Medication Sig Dispense Refill     Cyanocobalamin 2500 MCG CHEW Take by mouth daily       multivitamin, therapeutic with minerals (MULTI-VITAMIN) TABS tablet Take 1 tablet by mouth daily       sertraline (ZOLOFT) 50 MG tablet Take 1 tablet (50 mg) by mouth daily 30 tablet 2     Vitamin D, Cholecalciferol, 1000 units CAPS Take by mouth daily              ROS:  CONSTITUTIONAL: NEGATIVE for fever, chills, change in weight  INTEGUMENTARY/SKIN: NEGATIVE for worrisome rashes, moles or lesions  EYES: NEGATIVE for vision changes or irritation  ENT: NEGATIVE for ear, mouth and throat problems  RESP: NEGATIVE for significant cough or SOB  CV: NEGATIVE for chest pain, palpitations or peripheral edema  GI: NEGATIVE for nausea, abdominal pain, heartburn, or change in bowel habits   male: negative for dysuria, hematuria, decreased urinary stream, erectile dysfunction, urethral discharge  MUSCULOSKELETAL: NEGATIVE for significant arthralgias or myalgia  NEURO: NEGATIVE for weakness, dizziness or paresthesias  PSYCHIATRIC: NEGATIVE for changes in mood or affect    OBJECTIVE:   /80 (BP Location: Right arm, Cuff Size: Adult Large)   Pulse 81   Temp 97.4  F (36.3  C) (Oral)   Ht 1.727 m (5' 8\")   Wt 98.4 kg (217 lb)   SpO2 95%   BMI 32.99 kg/m    EXAM:  GENERAL: healthy, alert and no distress  EYES: Eyes grossly normal to inspection, PERRL and conjunctivae and sclerae normal  HENT: ear canals and TM's normal, nose and mouth without ulcers or lesions  NECK: no adenopathy, no asymmetry, masses, or scars and thyroid normal to palpation  RESP: lungs clear to auscultation - no rales, rhonchi or wheezes  CV: regular rate and rhythm, normal S1 S2, no S3 or S4, no murmur, click or rub, no " "peripheral edema and peripheral pulses strong  ABDOMEN: soft, nontender, no hepatosplenomegaly, no masses and bowel sounds normal  MS: no gross musculoskeletal defects noted, no edema  Testicular exam: no suspicious nodules.  SKIN: no suspicious lesions or rashes  NEURO: Normal strength and tone, mentation intact and speech normal  PSYCH: mentation appears normal, affect normal/bright        ASSESSMENT/PLAN:   Assessment and Plan:     (Z00.00) Routine general medical examination at a health care facility  (primary encounter diagnosis)  Comment: not fasting today.  Does not need lipids this year.  Plan: CBC with platelets, Comprehensive metabolic         panel        Td and flu shot given today    (F41.9) Anxiety  Comment:   Plan: MENTAL HEALTH REFERRAL  - Adult; Outpatient         Treatment; Individual/Couples/Family/Group         [place]Health [place] [facility] [place] [facility] [phone]; We will         contact you to schedule the appointment or         please call with any questions, sertraline         (ZOLOFT) 50 MG tablet        Start with 25mg every morning for the first week, then increase to 50mg every day. F/u 6-8 weeks.    (Z11.3) Screen for STD (sexually transmitted disease)  Comment:   Plan: HIV Antigen Antibody Combo            (F32.0) Mild major depression (H)  Comment:   Plan: zoloft as above.        COUNSELING:  Reviewed preventive health counseling, as reflected in patient instructions    BP Readings from Last 1 Encounters:   12/20/18 113/80     Estimated body mass index is 32.99 kg/m  as calculated from the following:    Height as of this encounter: 1.727 m (5' 8\").    Weight as of this encounter: 98.4 kg (217 lb).           reports that  has never smoked. he has never used smokeless tobacco.      Counseling Resources:  ATP IV Guidelines  Pooled Cohorts Equation Calculator  FRAX Risk Assessment  ICSI Preventive Guidelines  Dietary Guidelines for Americans, 2010  USDA's " MyPlate  ASA Prophylaxis  Lung CA Screening    Marge Brock PA-C  [facility]  Answers for HPI/ROS submitted by the patient on 12/15/2018

## 2018-12-21 LAB
ALBUMIN SERPL-MCNC: 4.3 G/DL (ref 3.4–5)
ALP SERPL-CCNC: 53 U/L (ref 40–150)
ALT SERPL W P-5'-P-CCNC: 28 U/L (ref 0–70)
ANION GAP SERPL CALCULATED.3IONS-SCNC: 9 MMOL/L (ref 3–14)
AST SERPL W P-5'-P-CCNC: 14 U/L (ref 0–45)
BILIRUB SERPL-MCNC: 0.3 MG/DL (ref 0.2–1.3)
BUN SERPL-MCNC: 16 MG/DL (ref 7–30)
CALCIUM SERPL-MCNC: 9.2 MG/DL (ref 8.5–10.1)
CHLORIDE SERPL-SCNC: 107 MMOL/L (ref 94–109)
CO2 SERPL-SCNC: 22 MMOL/L (ref 20–32)
CREAT SERPL-MCNC: 0.83 MG/DL (ref 0.66–1.25)
GFR SERPL CREATININE-BSD FRML MDRD: >90 ML/MIN/{1.73_M2}
GLUCOSE SERPL-MCNC: 109 MG/DL (ref 70–99)
HIV 1+2 AB+HIV1 P24 AG SERPL QL IA: NONREACTIVE
POTASSIUM SERPL-SCNC: 3.9 MMOL/L (ref 3.4–5.3)
PROT SERPL-MCNC: 7.2 G/DL (ref 6.8–8.8)
SODIUM SERPL-SCNC: 138 MMOL/L (ref 133–144)

## 2018-12-21 ASSESSMENT — ANXIETY QUESTIONNAIRES: GAD7 TOTAL SCORE: 14

## 2018-12-21 NOTE — RESULT ENCOUNTER NOTE
It was a pleasure seeing you for your physical examination.  I wanted to get back to you with your test results.  I have enclosed a copy for your review.      I am happy to report that your CBC or complete blood count is normal with no signs of anemia, leukemia or platelet abnormalities. Your chemistry panel shows no signs of diabetes but the blood sugar was borderline at 109.  I don't think you were fasting though so not to worry.  We will recheck this in a year.  Your blood salts, kidney tests, liver tests, and proteins are all fine.    Your HIV test was negative.    Let me know if you have any questions.    Marge Brock PA-C

## 2019-01-11 DIAGNOSIS — F41.9 ANXIETY: ICD-10-CM

## 2019-01-11 NOTE — TELEPHONE ENCOUNTER
"sertraline (ZOLOFT) 50 MG tablet  Last Written Prescription Date:  12/20/18  Last Fill Quantity: 30,  # refills: 2   Last office visit: 12/20/2018 with prescribing provider:  nima   Future Office Visit:   Next 5 appointments (look out 90 days)    Jan 25, 2019 12:30 PM CST  Return Visit with Elder Nunez  [facility] ([facility]) [address]  [facility] [id number] [phone]   Jan 31, 2019  4:30 PM CST  Office Visit with Marge Brock PA-C  [facility] ([facility]) [address] [facility] [id number] [phone]                 Requested Prescriptions   Pending Prescriptions Disp Refills     sertraline (ZOLOFT) 50 MG tablet [Pharmacy Med Name: SERTRALINE 50MG TABLETS] 30 tablet 0     Sig: TAKE 1 TABLET BY MOUTH DAILY    SSRIs Protocol Passed - 1/11/2019 10:33 AM       Passed - Recent (12 mo) or future (30 days) visit within the authorizing provider's specialty    Patient had office visit in the last 12 months or has a visit in the next 30 days with authorizing provider or within the authorizing provider's specialty.  See \"Patient Info\" tab in inbasket, or \"Choose Columns\" in Meds & Orders section of the refill encounter.             Passed - Medication is active on med list       Passed - Patient is age 18 or older          "

## 2019-01-14 DIAGNOSIS — F41.9 ANXIETY: ICD-10-CM

## 2019-01-15 NOTE — TELEPHONE ENCOUNTER
"sertraline (ZOLOFT) 50 MG tablet 30 tablet 2 12/20/2018     Last Written Prescription Date:  12/20/18  Last Fill Quantity: 30,  # refills: 2   Last office visit: 12/20/2018 with prescribing provider:  Delmy   Future Office Visit:   Next 5 appointments (look out 90 days)    Jan 25, 2019 12:30 PM CST  Return Visit with Elder Nunez  [facility] ([facility]) [address]  [facility] [id number] [phone]   Jan 31, 2019  4:30 PM CST  Office Visit with Marge Brock PA-C  [facility] ([facility]) [address] [facility] [id number] [phone]         Requested Prescriptions   Pending Prescriptions Disp Refills     sertraline (ZOLOFT) 50 MG tablet [Pharmacy Med Name: SERTRALINE 50MG TABLETS] 30 tablet 0     Sig: TAKE 1 TABLET BY MOUTH DAILY    SSRIs Protocol Passed - 1/14/2019  5:03 PM       Passed - Recent (12 mo) or future (30 days) visit within the authorizing provider's specialty    Patient had office visit in the last 12 months or has a visit in the next 30 days with authorizing provider or within the authorizing provider's specialty.  See \"Patient Info\" tab in inbasket, or \"Choose Columns\" in Meds & Orders section of the refill encounter.             Passed - Medication is active on med list       Passed - Patient is age 18 or older        [facility] Follow-up to PHQ 12/15/2018 12/20/2018   PHQ-9 9. Suicide Ideation past 2 weeks Not at all Not at all       "

## 2019-01-16 NOTE — TELEPHONE ENCOUNTER
PHQ-9 SCORE 12/15/2018 12/20/2018   PHQ-9 Total Score MyChart 10 (Moderate depression) -   PHQ-9 Total Score 10 14        PHQ9 greater than 5. Will route to Marge Brock to review

## 2019-01-25 ENCOUNTER — FCC EXTENDED DOCUMENTATION (OUTPATIENT)
Dept: PSYCHOLOGY | Facility: CLINIC | Age: 34
End: 2019-01-25

## 2019-01-25 ENCOUNTER — OFFICE VISIT (OUTPATIENT)
Dept: PSYCHOLOGY | Facility: CLINIC | Age: 34
End: 2019-01-25
Payer: COMMERCIAL

## 2019-01-25 DIAGNOSIS — F41.1 GENERALIZED ANXIETY DISORDER: ICD-10-CM

## 2019-01-25 DIAGNOSIS — F32.1 MODERATE MAJOR DEPRESSION (H): Primary | ICD-10-CM

## 2019-01-25 PROCEDURE — 90834 PSYTX W PT 45 MINUTES: CPT | Performed by: SOCIAL WORKER
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
## 2019-01-25 NOTE — PROGRESS NOTES
Progress Note - Initial Session    Client Name:  Suhas Hernandez Date: 1/25/19         Service Type: Individual      Session Start Time: 12:30pm  Session End Time: 1:20pm      Session Length: 38 - 52      Session #: 2     Attendees: Client attended alone    Diagnostic Assessment in progress.  Unable to complete documentation at the conclusion of the first session due to to therapeutic intervention associated with client's presentation of anxious features which interfered with the diagnostic structured interview. This writer actively listened to the clients presenting problems, assessed for immediate safety concerns, structured clients disclosures to minimize affection intensity. With compassion challenged clients interpretations/beleifs regarding his responses which supported reduced intensity of anxious feelings. The pt reported feeling less anxious by the end of the visit.       Mental Status Assessment:  Appearance:   Appropriate   Eye Contact:   Good   Psychomotor Behavior: Restless   Attitude:   Cooperative   Orientation:   All  Speech   Rate / Production: Slow    Volume:  Normal   Mood:    Anxious   Affect:    Labile   Thought Content:  Clear   Thought Form:  Coherent  Logical   Insight:    Good       Safety Issues and Plan for Safety and Risk Management:  Client denies current fears or concerns for personal safety.  Client denies current or recent suicidal ideation or behaviors.  Client denies current or recent homicidal ideation or behaviors.  Client denies current or recent self injurious behavior or ideation.  Client denies other safety concerns.  A safety and risk management plan has not been developed at this time, however client was given the after-hours number / 911 should there be a change in any of these risk factors.  Client reports there are no firearms in the house.      Diagnostic Criteria:  A. Excessive anxiety and worry about a number of events or activities (such as work or school  performance).   B. The person finds it difficult to control the worry.  C. Select 3 or more symptoms (required for diagnosis).    - Restlessness or feeling keyed up or on edge.    - Being easily fatigued.    - Difficulty concentrating or mind going blank.    - Irritability.    - Sleep disturbance (difficulty falling or staying asleep, or restless unsatisfying sleep).   D. The focus of the anxiety and worry is not confined to features of an Axis I disorder.  E. The anxiety, worry, or physical symptoms cause clinically significant distress or impairment in social, occupational, or other important areas of functioning.   F. The disturbance is not due to the direct physiological effects of a substance (e.g., a drug of abuse, a medication) or a general medical condition (e.g., hyperthyroidism) and does not occur exclusively during a Mood Disorder, a Psychotic Disorder, or a Pervasive Developmental Disorder.    - The aformentioned symptoms began 7 month(s) ago and occurs 4 days per week and is experienced as moderate.  CRITERIA (A-C) REPRESENT A MAJOR DEPRESSIVE EPISODE - SELECT THESE CRITERIA  A) Recurrent episode(s) - symptoms have been present during the same 2-week period and represent a change from previous functioning 5 or more symptoms (required for diagnosis)   - Depressed mood..     - Diminished interest or pleasure in all, or almost all, activities.    - Decreased sleep.    - Psychomotor activity agitation.    - Fatigue or loss of energy.    - Feelings of worthlessness or inappropriate and excessive guilt.    - Diminished ability to think or concentrate, or indecisiveness.   B) The symptoms cause clinically significant distress or impairment in social, occupational, or other important areas of functioning  C) The episode is not attributable to the physiological effects of a substance or to another medical condition  D) The occurence of major depressive episode is not better explained by other thought / psychotic  disorders  E) There has never been a manic episode or hypomanic episode      DSM5 Diagnoses: (Sustained by DSM5 Criteria Listed Above)  Diagnoses: 296.22 (F32.1)  Major Depressive Disorder, Single Episode, Moderate With anxious distress  300.02 (F41.1) Generalized Anxiety Disorder  Psychosocial & Contextual Factors: Work constraints, limited social support including family and friends  WHODAS 2.0 (12 item)- did not complete during visit.    Collateral Reports Completed:  Not Applicable    PLAN: (Homework, other):  Client stated that he will follow up for ongoing services with [facility].       LLOYD Portillo, LGSW, 1.25.19  Note reviewed and clinical supervision by [facility]D [facility] 2[facility]14/2019
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
## 2019-02-07 ENCOUNTER — OFFICE VISIT (OUTPATIENT)
Dept: FAMILY MEDICINE | Facility: CLINIC | Age: 34
End: 2019-02-07
Payer: COMMERCIAL

## 2019-02-07 VITALS
WEIGHT: 223.8 LBS | OXYGEN SATURATION: 95 % | SYSTOLIC BLOOD PRESSURE: 131 MMHG | TEMPERATURE: 96.7 F | HEIGHT: 68 IN | DIASTOLIC BLOOD PRESSURE: 93 MMHG | HEART RATE: 70 BPM | BODY MASS INDEX: 33.92 KG/M2

## 2019-02-07 DIAGNOSIS — F32.0 MILD MAJOR DEPRESSION (H): ICD-10-CM

## 2019-02-07 DIAGNOSIS — F41.9 ANXIETY: ICD-10-CM

## 2019-02-07 PROCEDURE — 99213 OFFICE O/P EST LOW 20 MIN: CPT | Performed by: PHYSICIAN ASSISTANT

## 2019-02-07 ASSESSMENT — PATIENT HEALTH QUESTIONNAIRE - PHQ9: SUM OF ALL RESPONSES TO PHQ QUESTIONS 1-9: 12

## 2019-02-07 ASSESSMENT — MIFFLIN-ST. JEOR: SCORE: 1939.65

## 2019-02-07 NOTE — PATIENT INSTRUCTIONS
Increase zoloft to 75mg daily for one week, then increase to 100mg daily after that for about a month.    We could further titrate the dose to 150mg.

## 2019-02-07 NOTE — PROGRESS NOTES
"  SUBJECTIVE:   Suhas Hernandez is a 33 year old male who presents to clinic today for the following health issues:    F/u anxiety and depression  See note dated 12/20/18 for more details  We started pt on zoloft at that time.  He did see a new counselor (Huog Nunez) and liked him  Has plans to f/u with that counselor next week.    Pt had diarrhea upon starting zoloft which has now resolved.  Hands and feet more sweaty on this medication but is can tolerate that.  He thinks he feels a little less anxious and depressed but hard to tell  Exercises twice per week (cardio and wts with a )  Sleeping more than before and waking up  Denies any SI    Past Medical History:   Diagnosis Date     Anxiety      Family history of hyperthyroidism 3/12/2012     Glaucoma      PTSD (post-traumatic stress disorder) 11/2010    [employer] volunteer in [facility].     Sweat, sweating, excessive 3/12/2012     Past Surgical History:   Procedure Laterality Date     MYRINGOTOMY, INSERT TUBE BILATERAL, COMBINED       Social History     Tobacco Use     Smoking status: Never Smoker     Smokeless tobacco: Never Used   Substance Use Topics     Alcohol use: Yes     Comment: 1-2 times a month     Current Outpatient Medications   Medication Sig Dispense Refill     Cyanocobalamin 2500 MCG CHEW Take by mouth daily       multivitamin, therapeutic with minerals (MULTI-VITAMIN) TABS tablet Take 1 tablet by mouth daily       sertraline (ZOLOFT) 50 MG tablet Take 2 tablets (100 mg) by mouth daily 180 tablet 1     Vitamin D, Cholecalciferol, 1000 units CAPS Take by mouth daily       No Known Allergies  FAMILY HISTORY NOTED AND REVIEWED    PHYSICAL EXAM:    BP (!) 131/93 (BP Location: Right arm, Cuff Size: Adult Large)   Pulse 70   Temp 96.7  F (35.9  C) (Tympanic)   Ht 1.727 m (5' 8\")   Wt 101.5 kg (223 lb 12.8 oz)   SpO2 95%   BMI 34.03 kg/m      Patient appears non toxic  Psych: approp affect and mood    Assessment and Plan:     (F32.0) Mild major " depression (H)  Comment: add another day of exercise and cont counseling  Plan:   Patient Instructions   Increase zoloft to 75mg daily for one week, then increase to 100mg daily after that for about a month.    We could further titrate the dose to 150mg.          (F41.9) Anxiety  Comment:   Plan: sertraline (ZOLOFT) 50 MG tablet        As above.      Marge Brock PA-C

## 2019-03-06 ENCOUNTER — OFFICE VISIT (OUTPATIENT)
Dept: PSYCHOLOGY | Facility: CLINIC | Age: 34
End: 2019-03-06
Payer: COMMERCIAL

## 2019-03-06 DIAGNOSIS — F32.1 MODERATE MAJOR DEPRESSION (H): Primary | ICD-10-CM

## 2019-03-06 DIAGNOSIS — F41.1 GENERALIZED ANXIETY DISORDER: ICD-10-CM

## 2019-03-06 DIAGNOSIS — F41.9 ANXIETY: ICD-10-CM

## 2019-03-06 PROCEDURE — 90791 PSYCH DIAGNOSTIC EVALUATION: CPT | Performed by: SOCIAL WORKER

## 2019-03-06 RX ORDER — SERTRALINE HYDROCHLORIDE 100 MG/1
150 TABLET, FILM COATED ORAL DAILY
Qty: 135 TABLET | Refills: 1 | Status: SHIPPED | OUTPATIENT
Start: 2019-03-06 | End: 2020-03-05

## 2019-03-06 NOTE — Clinical Note
Good asngeetha Mckinney,Can you please review this DA and recommend potential suggestions? Thanks!Hugo
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
## 2019-03-29 ENCOUNTER — MYC MEDICAL ADVICE (OUTPATIENT)
Dept: NUTRITION | Facility: CLINIC | Age: 34
End: 2019-03-29

## 2019-03-29 NOTE — TELEPHONE ENCOUNTER
"Angelo sent a [employer] message:  Ramón Caraballo,    My insurance is requesting \"any office  notes and history\" you may have from my two visits. I appealed their decision to deny the charges from those visits and my appeal is currently going through the process. Are you able to provide?    CDE reply:  Ramón Stephens,   Absolutely! I can share necessary information with \"payers\" (insurance) so just need to know where to send it- do you have a name and fax number? Or a number I can call to get that so it gets into the right hands?   Gissell Stephens,   Ok, I tried to send twice at 11:00 and again now and can hear it dialing, but getting a message that the fax isn't going through due to \"no answer\" so just thought I'd double check the fax # again with you ;) Or someone I can call?  We'll get there! Sorry you're having to do extra work around this.  Gissell     (note there is a signed consent for services in the chart)    "

## 2019-04-01 ENCOUNTER — OFFICE VISIT (OUTPATIENT)
Dept: PSYCHOLOGY | Facility: CLINIC | Age: 34
End: 2019-04-01
Payer: COMMERCIAL

## 2019-04-01 DIAGNOSIS — F41.1 GENERALIZED ANXIETY DISORDER: ICD-10-CM

## 2019-04-01 DIAGNOSIS — F32.1 MODERATE MAJOR DEPRESSION (H): Primary | ICD-10-CM

## 2019-04-01 PROCEDURE — 90834 PSYTX W PT 45 MINUTES: CPT | Performed by: SOCIAL WORKER
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
## 2019-04-01 NOTE — Clinical Note
Good morning Faye,Can you please review and recommend potential suggestions to this note? Thanks!Hugo

## 2019-04-26 ENCOUNTER — OFFICE VISIT (OUTPATIENT)
Dept: PSYCHOLOGY | Facility: CLINIC | Age: 34
End: 2019-04-26
Payer: COMMERCIAL

## 2019-04-26 DIAGNOSIS — F32.1 MODERATE MAJOR DEPRESSION (H): Primary | ICD-10-CM

## 2019-04-26 DIAGNOSIS — F41.1 GENERALIZED ANXIETY DISORDER: ICD-10-CM

## 2019-04-26 PROCEDURE — 90834 PSYTX W PT 45 MINUTES: CPT | Performed by: SOCIAL WORKER
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
## 2019-06-24 ENCOUNTER — OFFICE VISIT (OUTPATIENT)
Dept: PSYCHOLOGY | Facility: CLINIC | Age: 34
End: 2019-06-24
Payer: COMMERCIAL

## 2019-06-24 DIAGNOSIS — F32.1 MODERATE MAJOR DEPRESSION (H): Primary | ICD-10-CM

## 2019-06-24 DIAGNOSIS — F41.1 GENERALIZED ANXIETY DISORDER: ICD-10-CM

## 2019-06-24 PROCEDURE — 90834 PSYTX W PT 45 MINUTES: CPT | Performed by: SOCIAL WORKER

## 2019-06-24 NOTE — PROGRESS NOTES
"                                           Progress Note    Client Name: Suhas Hernandez  Date: 6.24.19         Service Type: Consult Note  Video Visit: No     Session Start Time: 3:30pm Session End Time: 4:20pm     Session Length: 50 minutes  Session #: 5    Attendees: Client attended alone     Treatment Plan Last Reviewed: reviewed  PHQ-9 / LAUREN-7 : Reviewed    DATA  Interactive Complexity: No  Crisis: No       Progress Since Last Session (Related to Symptoms / Goals / Homework):   Symptoms: Improving, pt  States his psychiatric medications were adjusted about a month ago and he feels his emotions have somewhat stabilized since then and feels less intense depressive symptoms than before. Pt states he still rumminates about specific worries but this is also better under control.     Homework: Completed in session      Episode of Care Goals: Minimal progress - ACTION (Actively working towards change); Intervened by reinforcing change plan / affirming steps taken     Current / Ongoing Stressors and Concerns: Discussed pt distancing himself further from family members and some friends since Easter. We processed how it can be emotionally draining for him to listen to family and friends who have significant mental health concerns. Processed how pt was able to put his needs first by taking a vacation to [place] with several friends. Pt states that he felt somewhat guilty for taking time away from work, which he states \"is my number one priority,\" but was able to acknowledge and let those thoughts go by getting him closer to his value of taking care of his overall emotionally health and well being. We also discussed pt reconnecting with a person he was in a relationship with from 2017 up until this past April. Discussed setting boundaries and affirming his needs/wants in the relationship.      Treatment Objective(s) Addressed in This Session:    Co-reviewed treatment plan.   Client will review and familiarize himself with " "feeling words. He will use feeling words to describe when his needs are met/not met.    Client will also identify areas of vulnerability that make his symptoms increase in frequency and intensity.   Client will identify 5 fears / thoughts that contribute to feeling anxious.   Client will compile a list of boundaries that they would like to set with others. Including co-workers and family members.     Intervention:   DBT: We discussed how when the intensity of emotions are high, it is easy to overlook other information about the situation that may provide us with a more realistic perspective. Provided pt with a \"check the facts\" worksheet and utilized an example of how he sensed his boyfriend was becoming irritated with him. Processed how pt has made more of an intentional effort to be more guarded around his boyfriend because he is regaining a sense of trust in their relationship. Discussed how it may be helpful to have  a formal conversation with his boyfriend around his decision to not meet up as frequent as in the past, so his boyfriend is not making assumptions about the change in behavior.        ASSESSMENT: Current Emotional / Mental Status (status of significant symptoms):   Risk status (Self / Other harm or suicidal ideation)   Client denies current fears or concerns for personal safety.   Client denies current or recent suicidal ideation or behaviors.   Client denies current or recent homicidal ideation or behaviors.   Client denies current or recent self injurious behavior or ideation.   Client denies other safety concerns.   Client Client reports there has been no change in risk factors since their last session.     Client Client reports there has been no change in protective factors since their last session.     A safety and risk management has not been developed at this time.  Client was given the Suicide Prevention National Hotline, Text [place] 440527,  the [facility] Crisis Number, or encouraged to Call 911 " should there be a change in these risk factors.       Appearance:   Appropriate    Eye Contact:   Good    Psychomotor Behavior: Restless    Attitude:   Cooperative    Orientation:   All   Speech    Rate / Production: Pressured     Volume:  Normal    Mood:    Anxious  Depressed  Normal Sad    Affect:    Worrisome    Thought Content:  Clear    Thought Form:  Coherent  Logical    Insight:    Fair      Medication Review:   No changes to current psychiatric medication(s)     Medication Compliance:   Yes     Changes in Health Issues:   None reported     Chemical Use Review:   Substance Use: Chemical use reviewed, no active concerns identified      Tobacco Use: No current tobacco use.      Diagnosis:  1. Moderate major depression (H)    2. Generalized anxiety disorder      Collateral Reports Completed:   Not Applicable    PLAN: (Client Tasks / Therapist Tasks / Other): Pt agreed to have a formal conversation with his boyfriend about his intention effort to not hang out as frequently as before because he needs to rebuild a sense of trust in their relationship.     LLOYD Portillo, [facility], 6.24.19  Note reviewed and clinical supervision by LLOYD Snow [facility] 6/28/2019                                                 ______________________________________________________________________    Treatment Plan    Client's Name: Suhas Hernandez  YOB: 1985    Date: 4.19.19; reviewed 6.25.19    DSM5 Diagnoses: (Sustained by DSM5 Criteria Listed Above)  Diagnoses:  296.32 (F33.1) Major Depressive Disorder, Recurrent Episode, Moderate With anxious distress  300.02 (F41.1) Generalized Anxiety Disorder  Psychosocial & Contextual Factors: Invalidating environment as a child, possible early childhood attachment issues, past traumatic experiences, stressful demanding work environment   WHODAS 2.0 (12 item)= 16    Referral / Collaboration:  Referral to another professional/service is not indicated at this  time.    Anticipated number of session or this episode of care: 10    MeasurableTreatment Goal(s) related to diagnosis / functional impairment(s)  Goal 1: Client will decrease his depressive symptoms as evidenced by his PHQ9 score.     I will know I've met my goal when I have more energy and feel genuinely better about myself .      Objective #A (Client Action)    Client will review and familiarize himself with feeling words. He will use feeling words to describe when his needs are met/not met.     Status: Completed - Date: 6/25/19     Intervention(s)  Therapist will assign emotional recognition/identification including when expressing when his needs are not being met or are being met.    Objective #B   Client will Increased understanding of depressive feelings, including developing vocabulary to describe depression and identify cues and symptoms. Client will also identify areas of vulnerability that make his symptoms increase in frequency and intensity.  Status: Ongoing - Date: 6/25/19    Intervention(s)  Therapist will provide educational materials on depression.  Therapist will teach the client how to perform a behavioral chain analysis.     Objective #C  Client will Increase interest, engagement, and pleasure in doing things  Decrease frequency and intensity of feeling down, depressed, hopeless.  Status: Completed - Date: 6/25/19    Intervention(s)  Therapist will provide educational materials and handouts on behavioral activation.      Objective #D  Client will Identify negative self-talk and behaviors: challenge core beliefs, myths, and actions.  Status: Ongoing - Date: 4/1/19     Intervention(s)  Therapist will provide educational materials and handouts on core beliefs, cognitive distortions, and ACT, including exploring and identifying important values in patient's life.    Goal 2: Client will decrease his anxiety as evidenced from his GAD7 score.    I will know I've met my goal when I am able to let things  go and allow myself permission to relax and not worry.      Objective #A  Client will identify 5 initial signs or symptoms of anxiety.    Status: Completed - Date: 6/25/19    Intervention(s)  Therapist will provide educational materials on the symptoms of anxiety.    Objective #B (Client Action)    Status: Ongoing - Date: 6/25/19    Client will identify 5 fears / thoughts that contribute to feeling anxious.    Intervention(s)  Therapist will teach the client how to perform a behavioral chain analysis in order to identify fears/thoughts contributing to anxious feelings.     Objective #C  Client will use at least 4 coping skills for anxiety management in the next 10 weeks.  Status: Ongoing - Date: 6/25/19    Intervention(s)  Therapist will teach progressive     muscle relaxation, cue control relaxation, mindful breathing, and guided imagery.    Goal 3: Client will become more assertive with his needs in relationships.     I will know I've met my goal when I am able to tell people no and let it go.      Objective #A (Client Action)    Status: Ongoing - Date: 6/25/19    Client will compile a list of boundaries that they would like to set with others. Including co-workers and family members.    Intervention(s)  Therapist will teach about healthy boundaries. Including information about different communication styles emphasizing assertive communication as the most helpful/healthy style.    Objective #B  Client will identify barriers to assertiveness.     Status: Ongoing - Date: 6/25/19    Intervention(s)  Therapist will provide educational materials on common barriers to assertive communication.    Objective #C  Client will track his communication style.  Status: Ongoing - Date: 6/25/19    Intervention(s)  Therapist will provide a communication style log for client to utilize outside of session.     Client has reviewed and agreed to the above plan.      Elder Nunez, LLOYD, LGSW April 1, 2019; June 25, 2019  Note  reviewed and clinical supervision by LLOYD Snow [facility] 4/5/2019

## 2019-07-11 ENCOUNTER — OFFICE VISIT (OUTPATIENT)
Dept: PSYCHOLOGY | Facility: CLINIC | Age: 34
End: 2019-07-11
Payer: COMMERCIAL

## 2019-07-11 DIAGNOSIS — F32.1 MODERATE MAJOR DEPRESSION (H): Primary | ICD-10-CM

## 2019-07-11 DIAGNOSIS — F41.1 GENERALIZED ANXIETY DISORDER: ICD-10-CM

## 2019-07-11 PROCEDURE — 90834 PSYTX W PT 45 MINUTES: CPT | Performed by: SOCIAL WORKER

## 2019-07-11 NOTE — PROGRESS NOTES
"                                           Progress Note    Client Name: Suhas Hernandez  Date: 7.11.19         Service Type: Consult Note  Video Visit: No     Session Start Time: 2:30pm Session End Time: 3:20pm     Session Length: 50 minutes  Session #: 6    Attendees: Client attended alone     Treatment Plan Last Reviewed: reviewed  PHQ-9 / LAUREN-7 : Reviewed    DATA  Interactive Complexity: No  Crisis: No       Progress Since Last Session (Related to Symptoms / Goals / Homework):   Symptoms: Improving, pt states that he has been taking more time away from work for himself which has helped to improve his mood.     Homework: Achieved / completed to satisfaction. Pt was able to have a conversation with his boyfriend about expectations and roles for their relationship.       Episode of Care Goals: Minimal progress - ACTION (Actively working towards change); Intervened by reinforcing change plan / affirming steps taken     Current / Ongoing Stressors and Concerns: Pt states that he continues to distance himself from his family. He reports that things are \"90% better with his boyfriend and he is happy\" with how things are going in their relationship. For the remainder of the visit, helped pt process his emotions related to in the past when he confronted his father about his drinking with his aunt, grandmother, and grandfather.        Treatment Objective(s) Addressed in This Session:     Client will review and familiarize himself with feeling words. He will use feeling words to describe when his needs are met/not met.    Client will also identify areas of vulnerability that make his symptoms increase in frequency and intensity.   Client will identify 5 fears / thoughts that contribute to feeling anxious.      Intervention:   CBT: Helped pt process his emotions related to in 2011 when he had an \"intervention\" with his father who is chronically dependent on alcohol. Discussed how father's physical health deteriorated over " "two years that pt was in [employer] Pt states, \"it was the last straw\" when his grandmother suggested he stay with his father for two days. Pt reports his father arrived late intoxicated. Pt stated to him, \"if you go out again and drink, I'm leaving\" Pt reports that his father proceeded to leave to go back out to the bars. Processed how 3 months later pt approached aunt, grandmother, and grandfather. He stated, \"I need to tell my father to go to rehab.\" Processed how what pt really wanted, however, was for his aunt, grandmother, and grandfather to tell his father the same thing, but they never did. Processed how pt may never here this from him and \"I need to accept this.\"       ASSESSMENT: Current Emotional / Mental Status (status of significant symptoms):   Risk status (Self / Other harm or suicidal ideation)   Client denies current fears or concerns for personal safety.   Client denies current or recent suicidal ideation or behaviors.   Client denies current or recent homicidal ideation or behaviors.   Client denies current or recent self injurious behavior or ideation.   Client denies other safety concerns.   Client Client reports there has been no change in risk factors since their last session.     Client Client reports there has been no change in protective factors since their last session.     A safety and risk management has not been developed at this time.  Client was given the Suicide Prevention National Hotline, Text [place] 060724,  the [facility] Crisis Number, or encouraged to Call 911 should there be a change in these risk factors.       Appearance:   Appropriate    Eye Contact:   Good    Psychomotor Behavior: Restless    Attitude:   Cooperative    Orientation:   All   Speech    Rate / Production: Pressured     Volume:  Normal    Mood:    Anxious  Depressed  Normal Sad    Affect:    Worrisome    Thought Content:  Clear    Thought Form:  Coherent  Logical    Insight:    Fair      Medication Review:   No " changes to current psychiatric medication(s)     Medication Compliance:   Yes     Changes in Health Issues:   None reported     Chemical Use Review:   Substance Use: Chemical use reviewed, no active concerns identified      Tobacco Use: No current tobacco use.      Diagnosis:  1. Moderate major depression (H)    2. Generalized anxiety disorder      Collateral Reports Completed:   Not Applicable    PLAN: (Client Tasks / Therapist Tasks / Other): Pt agreed to continue to practice self soothing skills, especially when he has emotional reaction related to his father and family.     LLOYD Portillo, [facility], 7.11.19          Note reviewed and clinical supervision by LLOYD Snow [facility] 7/26/2019                                      ______________________________________________________________________    Treatment Plan    Client's Name: Suhas Hernandez  YOB: 1985    Date: 4.19.19; reviewed 6.25.19    DSM5 Diagnoses: (Sustained by DSM5 Criteria Listed Above)  Diagnoses:  296.32 (F33.1) Major Depressive Disorder, Recurrent Episode, Moderate With anxious distress  300.02 (F41.1) Generalized Anxiety Disorder  Psychosocial & Contextual Factors: Invalidating environment as a child, possible early childhood attachment issues, past traumatic experiences, stressful demanding work environment   WHODAS 2.0 (12 item)= 16    Referral / Collaboration:  Referral to another professional/service is not indicated at this time.    Anticipated number of session or this episode of care: 10    MeasurableTreatment Goal(s) related to diagnosis / functional impairment(s)  Goal 1: Client will decrease his depressive symptoms as evidenced by his PHQ9 score.     I will know I've met my goal when I have more energy and feel genuinely better about myself .      Objective #A (Client Action)    Client will review and familiarize himself with feeling words. He will use feeling words to describe when his needs are met/not met.      Status: Completed - Date: 6/25/19     Intervention(s)  Therapist will assign emotional recognition/identification including when expressing when his needs are not being met or are being met.    Objective #B   Client will Increased understanding of depressive feelings, including developing vocabulary to describe depression and identify cues and symptoms. Client will also identify areas of vulnerability that make his symptoms increase in frequency and intensity.  Status: Ongoing - Date: 6/25/19    Intervention(s)  Therapist will provide educational materials on depression.  Therapist will teach the client how to perform a behavioral chain analysis.     Objective #C  Client will Increase interest, engagement, and pleasure in doing things  Decrease frequency and intensity of feeling down, depressed, hopeless.  Status: Completed - Date: 6/25/19    Intervention(s)  Therapist will provide educational materials and handouts on behavioral activation.      Objective #D  Client will Identify negative self-talk and behaviors: challenge core beliefs, myths, and actions.  Status: Ongoing - Date: 4/1/19     Intervention(s)  Therapist will provide educational materials and handouts on core beliefs, cognitive distortions, and ACT, including exploring and identifying important values in patient's life.    Goal 2: Client will decrease his anxiety as evidenced from his GAD7 score.    I will know I've met my goal when I am able to let things go and allow myself permission to relax and not worry.      Objective #A  Client will identify 5 initial signs or symptoms of anxiety.    Status: Completed - Date: 6/25/19    Intervention(s)  Therapist will provide educational materials on the symptoms of anxiety.    Objective #B (Client Action)    Status: Ongoing - Date: 6/25/19    Client will identify 5 fears / thoughts that contribute to feeling anxious.    Intervention(s)  Therapist will teach the client how to perform a behavioral chain  analysis in order to identify fears/thoughts contributing to anxious feelings.     Objective #C  Client will use at least 4 coping skills for anxiety management in the next 10 weeks.  Status: Ongoing - Date: 6/25/19    Intervention(s)  Therapist will teach progressive     muscle relaxation, cue control relaxation, mindful breathing, and guided imagery.    Goal 3: Client will become more assertive with his needs in relationships.     I will know I've met my goal when I am able to tell people no and let it go.      Objective #A (Client Action)    Status: Ongoing - Date: 6/25/19    Client will compile a list of boundaries that they would like to set with others. Including co-workers and family members.    Intervention(s)  Therapist will teach about healthy boundaries. Including information about different communication styles emphasizing assertive communication as the most helpful/healthy style.    Objective #B  Client will identify barriers to assertiveness.     Status: Ongoing - Date: 6/25/19    Intervention(s)  Therapist will provide educational materials on common barriers to assertive communication.    Objective #C  Client will track his communication style.  Status: Ongoing - Date: 6/25/19    Intervention(s)  Therapist will provide a communication style log for client to utilize outside of session.     Client has reviewed and agreed to the above plan.      LLOYD Portillo, SW April 1, 2019; June 25, 2019  Note reviewed and clinical supervision by LLOYD Snow [facility] 4/5/2019

## 2019-07-12 ASSESSMENT — COLUMBIA-SUICIDE SEVERITY RATING SCALE - C-SSRS
2. HAVE YOU ACTUALLY HAD ANY THOUGHTS OF KILLING YOURSELF LIFETIME?: NO
6. HAVE YOU EVER DONE ANYTHING, STARTED TO DO ANYTHING, OR PREPARED TO DO ANYTHING TO END YOUR LIFE?: NO
2. HAVE YOU ACTUALLY HAD ANY THOUGHTS OF KILLING YOURSELF?: NO
4. HAVE YOU HAD THESE THOUGHTS AND HAD SOME INTENTION OF ACTING ON THEM?: NO
1. IN THE PAST MONTH, HAVE YOU WISHED YOU WERE DEAD OR WISHED YOU COULD GO TO SLEEP AND NOT WAKE UP?: NO
5. HAVE YOU STARTED TO WORK OUT OR WORKED OUT THE DETAILS OF HOW TO KILL YOURSELF? DO YOU INTEND TO CARRY OUT THIS PLAN?: NO
5. HAVE YOU STARTED TO WORK OUT OR WORKED OUT THE DETAILS OF HOW TO KILL YOURSELF? DO YOU INTEND TO CARRY OUT THIS PLAN?: NO
ATTEMPT LIFETIME: NO
TOTAL  NUMBER OF INTERRUPTED ATTEMPTS LIFETIME: NO
3. HAVE YOU BEEN THINKING ABOUT HOW YOU MIGHT KILL YOURSELF?: NO
1. IN THE PAST MONTH, HAVE YOU WISHED YOU WERE DEAD OR WISHED YOU COULD GO TO SLEEP AND NOT WAKE UP?: NO
TOTAL  NUMBER OF ABORTED OR SELF INTERRUPTED ATTEMPTS PAST LIFETIME: NO
4. HAVE YOU HAD THESE THOUGHTS AND HAD SOME INTENTION OF ACTING ON THEM?: NO

## 2019-08-07 DIAGNOSIS — F41.9 ANXIETY: ICD-10-CM

## 2019-08-07 NOTE — TELEPHONE ENCOUNTER
"Requested Prescriptions   Pending Prescriptions Disp Refills     sertraline (ZOLOFT) 50 MG tablet [Pharmacy Med Name: SERTRALINE 50MG TABLETS] 180 tablet 0     Sig: TAKE 2 TABLETS(100 MG) BY MOUTH DAILY  Last Written Prescription Date:  8/7/19  Last Fill Quantity: 180 tab,  # refills: 0   Last office visit: 2/7/2019 with prescribing provider:  Delmy   Future Office Visit:         SSRIs Protocol Passed - 8/7/2019  9:34 AM        Passed - Recent (12 mo) or future (30 days) visit within the authorizing provider's specialty     Patient had office visit in the last 12 months or has a visit in the next 30 days with authorizing provider or within the authorizing provider's specialty.  See \"Patient Info\" tab in inbasket, or \"Choose Columns\" in Meds & Orders section of the refill encounter.              Passed - Medication is active on med list        Passed - Patient is age 18 or older          "

## 2019-08-08 NOTE — TELEPHONE ENCOUNTER
Dose was increased by PCP 2/7/19.  Prescription approved per [facility] Protocol.  Barbara Izaguirre RN

## 2019-08-19 ENCOUNTER — TELEPHONE (OUTPATIENT)
Dept: FAMILY MEDICINE | Facility: CLINIC | Age: 34
End: 2019-08-19

## 2019-08-19 ENCOUNTER — E-VISIT (OUTPATIENT)
Dept: FAMILY MEDICINE | Facility: CLINIC | Age: 34
End: 2019-08-19
Payer: COMMERCIAL

## 2019-08-19 DIAGNOSIS — Z53.9 ERRONEOUS ENCOUNTER--DISREGARD: Primary | ICD-10-CM

## 2019-08-19 NOTE — TELEPHONE ENCOUNTER
Cancelling e-visit, OV needed instead. Erroneous encounter, please disregard    Vandana ARANA RN

## 2019-08-19 NOTE — TELEPHONE ENCOUNTER
Left message asking patient to call back     Cancelled e-visit and sent a MyChart message too     Vandana ARANA RN

## 2019-08-19 NOTE — TELEPHONE ENCOUNTER
Please call pt and have him make an office visit with us or his eye doctor.      Marge Brock PA-C      ----- Message -----   From: Suhas Hernandez   Sent: 8/19/2019  10:47 AM   To: Marge Brock PA-C   Subject: E-Visit Submission: Other                          E-Visit Submission: Other   --------------------------------      Question: Please describe your symptoms.   Answer:   My right eye Has been sore for two days. I don t feel a bump or any swelling to speak of. What do you think is going on?      Question: Have you had these symptoms before?   Answer:   No      Question: How long have you been having these symptoms?   Answer:   For a few days      Question: Please list any medications you are currently taking for this condition.   Answer:   Sertealine      Question: Please describe any probable cause for these symptoms.    Answer:   Unknown.  I have not hit my eye or etc.      Question: Wrap up   Answer:         Question: Anything else you would like to add?   Answer:

## 2019-08-20 NOTE — TELEPHONE ENCOUNTER
TO PCP:  Spoke with patient     States he went to [facility]     Corneal abrasion was the diagnosis, taking Ciprofloxacin abx     They recommend following up with Marge or Optometrist for this     Pt asking if PCP would recommend seeing her or should he just follow up with his eye doctor (he does see one once a year)      Pt asking for response via Goyo ARANA RN

## 2019-10-09 ENCOUNTER — MYC MEDICAL ADVICE (OUTPATIENT)
Dept: FAMILY MEDICINE | Facility: CLINIC | Age: 34
End: 2019-10-09

## 2019-10-09 DIAGNOSIS — F41.9 ANXIETY: ICD-10-CM

## 2019-10-10 RX ORDER — SERTRALINE HYDROCHLORIDE 100 MG/1
150 TABLET, FILM COATED ORAL DAILY
Qty: 135 TABLET | Refills: 1 | Status: CANCELLED | OUTPATIENT
Start: 2019-10-10

## 2019-10-10 NOTE — TELEPHONE ENCOUNTER
TO PCP:     Per below, pt reports he takes both 50mg tablets (100mg) and 100mg tablets (150mg) for total dose of 250mg daily.     100mg tabs pended as requested     Please advise     Vandana ARANA RN

## 2019-10-11 NOTE — TELEPHONE ENCOUNTER
[employer] message sent to patient with PCP's response     Will also route to [facility] to contact pt to assist with scheduling OV     Thank you  Vandana ARANA RN

## 2019-10-16 ENCOUNTER — OFFICE VISIT (OUTPATIENT)
Dept: FAMILY MEDICINE | Facility: CLINIC | Age: 34
End: 2019-10-16
Payer: COMMERCIAL

## 2019-10-16 VITALS
BODY MASS INDEX: 30.92 KG/M2 | HEART RATE: 66 BPM | HEIGHT: 68 IN | WEIGHT: 204 LBS | DIASTOLIC BLOOD PRESSURE: 84 MMHG | OXYGEN SATURATION: 98 % | SYSTOLIC BLOOD PRESSURE: 123 MMHG | TEMPERATURE: 97.3 F

## 2019-10-16 DIAGNOSIS — F41.9 ANXIETY: Primary | ICD-10-CM

## 2019-10-16 DIAGNOSIS — K59.09 CHRONIC CONSTIPATION: ICD-10-CM

## 2019-10-16 PROCEDURE — 99213 OFFICE O/P EST LOW 20 MIN: CPT | Performed by: PHYSICIAN ASSISTANT

## 2019-10-16 ASSESSMENT — PATIENT HEALTH QUESTIONNAIRE - PHQ9: SUM OF ALL RESPONSES TO PHQ QUESTIONS 1-9: 6

## 2019-10-16 ASSESSMENT — MIFFLIN-ST. JEOR: SCORE: 1844.84

## 2019-10-16 NOTE — PROGRESS NOTES
"HPI: Angelo is a  did see his eye doctor for corneal abrasion which has since healed  He had severe pain and not sure how this occurred  He was accidentally taking zoloft 250mg/d as didn't understand the increase  He is seeing a counselor and notes his anxiety and depression better.  He hates the holidays and that make things worse    For his whole life he has had irreg BMs (constipation)  He did have a colonoscopy in high school for this problem, but wasn't cleaned out   He does use metamucil regularly (2 scoops per day)  Tries to eat high fiber diet.  Can sometimes only one BM per week.  He might have tried Miralax but doesn't recall if it helped.    Past Medical History:   Diagnosis Date     Anxiety      Family history of hyperthyroidism 3/12/2012     Glaucoma      PTSD (post-traumatic stress disorder) 11/2010    [employer] volunteer in [facility].     Sweat, sweating, excessive 3/12/2012     Past Surgical History:   Procedure Laterality Date     MYRINGOTOMY, INSERT TUBE BILATERAL, COMBINED       Social History     Tobacco Use     Smoking status: Never Smoker     Smokeless tobacco: Never Used   Substance Use Topics     Alcohol use: Yes     Comment: 1-2 times a month     Current Outpatient Medications   Medication Sig Dispense Refill     Cyanocobalamin 2500 MCG CHEW Take by mouth daily       multivitamin, therapeutic with minerals (MULTI-VITAMIN) TABS tablet Take 1 tablet by mouth daily       sertraline (ZOLOFT) 100 MG tablet Take 1.5 tablets (150 mg) by mouth daily 135 tablet 1     Vitamin D, Cholecalciferol, 1000 units CAPS Take by mouth daily       No Known Allergies  FAMILY HISTORY NOTED AND REVIEWED    PHYSICAL EXAM:    /84 (BP Location: Right arm, Patient Position: Chair, Cuff Size: Adult Regular)   Pulse 66   Temp 97.3  F (36.3  C) (Tympanic)   Ht 1.727 m (5' 8\")   Wt 92.5 kg (204 lb)   SpO2 98%   BMI 31.02 kg/m      Patient appears non toxic  Psych: approp affect and mood    Assessment and Plan: "     (F41.9) Anxiety  (primary encounter diagnosis)  Comment: he was taking the medications incorrectly  Plan: decr dose to 150/d and cont with counseling  Patient Instructions   Zoloft: decrease from 250mg daily to 200mg daily for 3 days, then stay on 150mg daily after that.    For constipation:     Stop metamucil and start miralax 1-2 times per day  Continue water  Avoid constipating foods like cheese and banana  You could add a stool softener (Colace) or you could add a magnesium supplement (500mg) per day        (K59.09) Chronic constipation  Comment:   Plan: as above.      Marge Brock PA-C

## 2019-10-16 NOTE — PATIENT INSTRUCTIONS
Zoloft: decrease from 250mg daily to 200mg daily for 3 days, then stay on 150mg daily after that.    For constipation:     Stop metamucil and start miralax 1-2 times per day  Continue water  Avoid constipating foods like cheese and banana  You could add a stool softener (Colace) or you could add a magnesium supplement (500mg) per day

## 2020-03-01 ENCOUNTER — HEALTH MAINTENANCE LETTER (OUTPATIENT)
Age: 35
End: 2020-03-01

## 2020-03-10 ENCOUNTER — OFFICE VISIT (OUTPATIENT)
Dept: FAMILY MEDICINE | Facility: CLINIC | Age: 35
End: 2020-03-10
Payer: COMMERCIAL

## 2020-03-10 VITALS
OXYGEN SATURATION: 99 % | HEIGHT: 68 IN | SYSTOLIC BLOOD PRESSURE: 120 MMHG | WEIGHT: 213 LBS | BODY MASS INDEX: 32.28 KG/M2 | DIASTOLIC BLOOD PRESSURE: 83 MMHG | HEART RATE: 64 BPM | TEMPERATURE: 97.6 F

## 2020-03-10 DIAGNOSIS — F43.10 PTSD (POST-TRAUMATIC STRESS DISORDER): ICD-10-CM

## 2020-03-10 DIAGNOSIS — F51.01 PRIMARY INSOMNIA: ICD-10-CM

## 2020-03-10 DIAGNOSIS — R05.9 COUGH: ICD-10-CM

## 2020-03-10 DIAGNOSIS — F41.9 ANXIETY: Primary | ICD-10-CM

## 2020-03-10 DIAGNOSIS — F32.0 MILD MAJOR DEPRESSION (H): ICD-10-CM

## 2020-03-10 PROCEDURE — 99214 OFFICE O/P EST MOD 30 MIN: CPT | Performed by: PHYSICIAN ASSISTANT

## 2020-03-10 RX ORDER — FLUTICASONE PROPIONATE 50 MCG
1 SPRAY, SUSPENSION (ML) NASAL DAILY
Qty: 16 G | Refills: 3 | Status: SHIPPED | OUTPATIENT
Start: 2020-03-10 | End: 2020-12-15

## 2020-03-10 RX ORDER — SERTRALINE HYDROCHLORIDE 100 MG/1
150 TABLET, FILM COATED ORAL DAILY
Qty: 135 TABLET | Refills: 1 | COMMUNITY
Start: 2020-03-10 | End: 2020-06-16

## 2020-03-10 ASSESSMENT — MIFFLIN-ST. JEOR: SCORE: 1885.66

## 2020-03-10 NOTE — PROGRESS NOTES
"HPI: Angelo is a 33 yo male with PTSD here for f/u depression and anxiety  He doesn't feel great in terms of his anxiety  He feels the depression has leveled however, mostly bothered by anxiety now  Has had anxiety and depression throughout his life  The anxiety is about \"everything\" and nothing in particular  Can't sleep due to anxiety at night  He is taking melatonin which isn't working.  Caffeine: rare  Exercise:  twice per week and walks around [place]  He is meeting his therapist once per month since about 2012    He has had a cough x 3 weeks   Started around 2/14/20 and had a cold (cough, runny nose, HA)  Got better for a few days but cough recurred 2/23 and won't resolve  Denies sob or wheezing. Cough is loose but non prod.  Denies hx of asthma or smoking  No fever or sore throat.      Past Medical History:   Diagnosis Date     Anxiety      Corneal abrasion 2019     Family history of hyperthyroidism 3/12/2012     Glaucoma      PTSD (post-traumatic stress disorder) 11/2010    [employer] volunteer in [facility].     Sweat, sweating, excessive 3/12/2012     Past Surgical History:   Procedure Laterality Date     MYRINGOTOMY, INSERT TUBE BILATERAL, COMBINED       Social History     Tobacco Use     Smoking status: Never Smoker     Smokeless tobacco: Never Used   Substance Use Topics     Alcohol use: Yes     Comment: 1-2 times a month     Current Outpatient Medications   Medication Sig Dispense Refill     amitriptyline (ELAVIL) 25 MG tablet Take 1 tablet (25 mg) by mouth At Bedtime 30 tablet 3     Cyanocobalamin 2500 MCG CHEW Take by mouth daily       multivitamin, therapeutic with minerals (MULTI-VITAMIN) TABS tablet Take 1 tablet by mouth daily       sertraline (ZOLOFT) 100 MG tablet Take 1.5 tablets (150 mg) by mouth daily 135 tablet 1     Vitamin D, Cholecalciferol, 1000 units CAPS Take by mouth daily       No Known Allergies  FAMILY HISTORY NOTED AND REVIEWED    PHYSICAL EXAM:    /83 (BP Location: Right " "arm, Patient Position: Sitting, Cuff Size: Adult Large)   Pulse 64   Temp 97.6  F (36.4  C) (Tympanic)   Ht 1.727 m (5' 8\")   Wt 96.6 kg (213 lb)   SpO2 99%   BMI 32.39 kg/m      Patient appears non toxic  Psych: approp affect or mood  Lungs: CTA bilat  Heart; RRR    Assessment and Plan:     (F41.9) Anxiety  (primary encounter diagnosis)  Comment: pt is currently taking 150mg every day.  Insurance will not cover having him take 3 of the 50mg pills so take 1.5 tabs of the zoloft 100mg daily.  Plan: sertraline (ZOLOFT) 100 MG tablet            (F51.01) Primary insomnia  Comment: he is not sleeping well with melatonin so stop that and start elavil 25-50mg at bedtime  Plan: amitriptyline (ELAVIL) 25 MG tablet        If anxiety persists despite helping him with insomnia, consider other options such as gabapentin.    (R05) Cough  Comment: lungs are clear. Suspect post nasal drip or post viral bronchospasm  Plan: fluticasone (FLONASE) 50 MCG/ACT nasal spray        Recd trial of flonase. If cough persists, he should f/u    (F32.0) Mild major depression (H)  Comment:   Plan: pt states this stable    (F43.10) PTSD (post-traumatic stress disorder)  Comment:   Plan: pt is working with his therapist.      Marge Brock PA-C        "

## 2020-06-16 ENCOUNTER — VIRTUAL VISIT (OUTPATIENT)
Dept: FAMILY MEDICINE | Facility: CLINIC | Age: 35
End: 2020-06-16
Payer: COMMERCIAL

## 2020-06-16 DIAGNOSIS — F41.9 ANXIETY: Primary | ICD-10-CM

## 2020-06-16 DIAGNOSIS — F43.10 PTSD (POST-TRAUMATIC STRESS DISORDER): ICD-10-CM

## 2020-06-16 DIAGNOSIS — F32.0 MILD MAJOR DEPRESSION (H): ICD-10-CM

## 2020-06-16 PROCEDURE — 99214 OFFICE O/P EST MOD 30 MIN: CPT | Mod: 95 | Performed by: PHYSICIAN ASSISTANT

## 2020-06-16 RX ORDER — ESCITALOPRAM OXALATE 10 MG/1
10 TABLET ORAL DAILY
Qty: 30 TABLET | Refills: 1 | Status: SHIPPED | OUTPATIENT
Start: 2020-06-16 | End: 2020-08-06

## 2020-06-16 ASSESSMENT — PATIENT HEALTH QUESTIONNAIRE - PHQ9: SUM OF ALL RESPONSES TO PHQ QUESTIONS 1-9: 6

## 2020-06-16 NOTE — PATIENT INSTRUCTIONS
Decrease sertraline to 100mg for one week, then 50mg for one week, then stop and start lexapro 10mg daily.    Stop the amitriptyline for now since not helping

## 2020-06-16 NOTE — PROGRESS NOTES
"Suhas Hernandez is a 35 year old male who is being evaluated via a billable telephone visit.      The patient has been notified of following:     \"This telephone visit will be conducted via a call between you and your physician/provider. We have found that certain health care needs can be provided without the need for a physical exam.  This service lets us provide the care you need with a short phone conversation.  If a prescription is necessary we can send it directly to your pharmacy.  If lab work is needed we can place an order for that and you can then stop by our lab to have the test done at a later time.    Telephone visits are billed at different rates depending on your insurance coverage. During this emergency period, for some insurers they may be billed the same as an in-person visit.  Please reach out to your insurance provider with any questions.    If during the course of the call the physician/provider feels a telephone visit is not appropriate, you will not be charged for this service.\"    Patient has given verbal consent for Telephone visit?  Yes    What phone number would you like to be contacted at? [phone]    How would you like to obtain your AVS? MyChart    Subjective     Suhas Hernandez is a 35 year old male who presents via phone visit today for the following health issues:    HPI     F/u anxiety: he is taking zoloft 150mg every day plus elavil 25mg at bedtime for insomnia  He is followed by a therapist regularly  He doesn't think zoloft is working for him  The elavil is not helping him sleep  Has appt with a new therapist on Friday  He is happy to be able to go back to the gym    Past Medical History:   Diagnosis Date     Anxiety      Corneal abrasion 2019     Family history of hyperthyroidism 3/12/2012     Glaucoma      PTSD (post-traumatic stress disorder) 11/2010    [employer] volunteer in [facility].     Sweat, sweating, excessive 3/12/2012     Current Outpatient Medications   Medication Sig " Dispense Refill     amitriptyline (ELAVIL) 25 MG tablet Take 1 tablet (25 mg) by mouth At Bedtime 30 tablet 3     Cyanocobalamin 2500 MCG CHEW Take by mouth daily       fluticasone (FLONASE) 50 MCG/ACT nasal spray Spray 1 spray into both nostrils daily 16 g 3     multivitamin, therapeutic with minerals (MULTI-VITAMIN) TABS tablet Take 1 tablet by mouth daily       sertraline (ZOLOFT) 100 MG tablet Take 1.5 tablets (150 mg) by mouth daily 135 tablet 1     Vitamin D, Cholecalciferol, 1000 units CAPS Take by mouth daily           Reviewed and updated as needed this visit by Provider         Review of Systems   Constitutional, HEENT, cardiovascular, pulmonary, gi and gu systems are negative, except as otherwise noted.       Objective   Reported vitals:  There were no vitals taken for this visit.   healthy, alert and no distress  PSYCH: Alert and oriented times 3; coherent speech, normal   rate and volume, able to articulate logical thoughts, able   to abstract reason, no tangential thoughts, no hallucinations   or delusions  His affect is normal  RESP: No cough, no audible wheezing, able to talk in full sentences  Remainder of exam unable to be completed due to telephone visits          Assessment and Plan:     (F41.9) Anxiety  (primary encounter diagnosis)  Comment: taper off of zoloft and start lexapro  Plan: escitalopram (LEXAPRO) 10 MG tablet            (F43.10) PTSD (post-traumatic stress disorder)  Comment:   Plan: has appt with a new counselor in a few days    (F32.0) Mild major depression (H)  Comment: f/u with how he is feeling with this change in 5-6 weeks.  Plan: escitalopram (LEXAPRO) 10 MG tablet          * he has some constipation so recd miralax or metamucil    Marge Brock PA-C        Phone call duration: 15 minutes

## 2020-09-29 DIAGNOSIS — F41.9 ANXIETY: ICD-10-CM

## 2020-09-29 DIAGNOSIS — F32.0 MILD MAJOR DEPRESSION (H): ICD-10-CM

## 2020-09-30 RX ORDER — ESCITALOPRAM OXALATE 10 MG/1
TABLET ORAL
Qty: 90 TABLET | Refills: 0 | Status: SHIPPED | OUTPATIENT
Start: 2020-09-30 | End: 2020-12-15 | Stop reason: DRUGHIGH

## 2020-09-30 NOTE — TELEPHONE ENCOUNTER
Routing refill request to provider for review/approval because:  PHQ-9 score was 6 in 6/2020- routing to Provider per protocol       Please review and authorize if appropriate,     Thank you,   Erica PITTS RN

## 2020-12-03 ENCOUNTER — TELEPHONE (OUTPATIENT)
Dept: FAMILY MEDICINE | Facility: CLINIC | Age: 35
End: 2020-12-03

## 2020-12-03 NOTE — TELEPHONE ENCOUNTER
Spoke with patient and changed appointment for 12/15/2020.    Blanka Hill ,[facility]

## 2020-12-03 NOTE — TELEPHONE ENCOUNTER
Reason for call:  Other   Patient called regarding (reason for call): appointment  Additional comments: Patient want to know if Provider will see him for Med review. Patient is okay with Video or Telephone appointment. Would like something a lot sooner than what is currently scheduled. Please call or message through [employer]    Phone number to reach patient:  Home number on file [phone] (home)    Best Time:  any    Can we leave a detailed message on this number?  YES    Travel screening: Not Applicable

## 2020-12-15 ENCOUNTER — OFFICE VISIT (OUTPATIENT)
Dept: FAMILY MEDICINE | Facility: CLINIC | Age: 35
End: 2020-12-15
Payer: COMMERCIAL

## 2020-12-15 VITALS
WEIGHT: 218 LBS | TEMPERATURE: 97 F | DIASTOLIC BLOOD PRESSURE: 84 MMHG | HEART RATE: 78 BPM | BODY MASS INDEX: 33.04 KG/M2 | OXYGEN SATURATION: 97 % | HEIGHT: 68 IN | SYSTOLIC BLOOD PRESSURE: 118 MMHG

## 2020-12-15 DIAGNOSIS — F32.0 MILD MAJOR DEPRESSION (H): ICD-10-CM

## 2020-12-15 DIAGNOSIS — Z11.4 SCREENING FOR HIV (HUMAN IMMUNODEFICIENCY VIRUS): ICD-10-CM

## 2020-12-15 DIAGNOSIS — F41.9 ANXIETY: ICD-10-CM

## 2020-12-15 DIAGNOSIS — Z00.00 ROUTINE GENERAL MEDICAL EXAMINATION AT A HEALTH CARE FACILITY: Primary | ICD-10-CM

## 2020-12-15 DIAGNOSIS — Z11.59 ENCOUNTER FOR HEPATITIS C SCREENING TEST FOR LOW RISK PATIENT: ICD-10-CM

## 2020-12-15 LAB
ALBUMIN SERPL-MCNC: 4 G/DL (ref 3.4–5)
ALP SERPL-CCNC: 74 U/L (ref 40–150)
ALT SERPL W P-5'-P-CCNC: 25 U/L (ref 0–70)
ANION GAP SERPL CALCULATED.3IONS-SCNC: 5 MMOL/L (ref 3–14)
AST SERPL W P-5'-P-CCNC: 13 U/L (ref 0–45)
BILIRUB SERPL-MCNC: 0.4 MG/DL (ref 0.2–1.3)
BUN SERPL-MCNC: 14 MG/DL (ref 7–30)
CALCIUM SERPL-MCNC: 9 MG/DL (ref 8.5–10.1)
CHLORIDE SERPL-SCNC: 106 MMOL/L (ref 94–109)
CO2 SERPL-SCNC: 28 MMOL/L (ref 20–32)
CREAT SERPL-MCNC: 0.91 MG/DL (ref 0.66–1.25)
ERYTHROCYTE [DISTWIDTH] IN BLOOD BY AUTOMATED COUNT: 13.6 % (ref 10–15)
GFR SERPL CREATININE-BSD FRML MDRD: >90 ML/MIN/{1.73_M2}
GLUCOSE SERPL-MCNC: 108 MG/DL (ref 70–99)
HCT VFR BLD AUTO: 44.6 % (ref 40–53)
HGB BLD-MCNC: 15.1 G/DL (ref 13.3–17.7)
MCH RBC QN AUTO: 28.2 PG (ref 26.5–33)
MCHC RBC AUTO-ENTMCNC: 33.9 G/DL (ref 31.5–36.5)
MCV RBC AUTO: 83 FL (ref 78–100)
PLATELET # BLD AUTO: 323 10E9/L (ref 150–450)
POTASSIUM SERPL-SCNC: 4.2 MMOL/L (ref 3.4–5.3)
PROT SERPL-MCNC: 7.7 G/DL (ref 6.8–8.8)
RBC # BLD AUTO: 5.35 10E12/L (ref 4.4–5.9)
SODIUM SERPL-SCNC: 139 MMOL/L (ref 133–144)
WBC # BLD AUTO: 5.2 10E9/L (ref 4–11)

## 2020-12-15 PROCEDURE — 36415 COLL VENOUS BLD VENIPUNCTURE: CPT | Performed by: PHYSICIAN ASSISTANT

## 2020-12-15 PROCEDURE — 99395 PREV VISIT EST AGE 18-39: CPT | Performed by: PHYSICIAN ASSISTANT

## 2020-12-15 PROCEDURE — 87389 HIV-1 AG W/HIV-1&-2 AB AG IA: CPT | Performed by: PHYSICIAN ASSISTANT

## 2020-12-15 PROCEDURE — 80053 COMPREHEN METABOLIC PANEL: CPT | Performed by: PHYSICIAN ASSISTANT

## 2020-12-15 PROCEDURE — 85027 COMPLETE CBC AUTOMATED: CPT | Performed by: PHYSICIAN ASSISTANT

## 2020-12-15 PROCEDURE — 86803 HEPATITIS C AB TEST: CPT | Performed by: PHYSICIAN ASSISTANT

## 2020-12-15 RX ORDER — HYDROXYZINE HYDROCHLORIDE 25 MG/1
25 TABLET, FILM COATED ORAL 3 TIMES DAILY PRN
Qty: 30 TABLET | Refills: 3 | Status: SHIPPED | OUTPATIENT
Start: 2020-12-15 | End: 2021-08-12

## 2020-12-15 RX ORDER — ESCITALOPRAM OXALATE 20 MG/1
20 TABLET ORAL DAILY
Qty: 90 TABLET | Refills: 1 | Status: SHIPPED | OUTPATIENT
Start: 2020-12-15 | End: 2021-05-21

## 2020-12-15 ASSESSMENT — MIFFLIN-ST. JEOR: SCORE: 1903.34

## 2020-12-15 NOTE — PROGRESS NOTES
SUBJECTIVE:   CC: Suhas Hernandez is an 35 year old male who presents for preventative health visit.     Pt is taking lexapro 10mg has helped with depression, but still has sxs of anxiety including racing mind, occas trouble sleeping although melatonin is helping with that.    Requests HIV screening      Patient has been advised of split billing requirements and indicates understanding: Yes  Healthy Habits:     Getting at least 3 servings of Calcium per day:  Yes    Bi-annual eye exam:  Yes    Dental care twice a year:  Yes    Sleep apnea or symptoms of sleep apnea:  None    Diet:  Regular (no restrictions)    Frequency of exercise:  1 day/week    Duration of exercise:  30-45 minutes    Taking medications regularly:  Yes    Medication side effects:  None    PHQ-2 Total Score: 2    Additional concerns today:  Yes          Today's PHQ-2 Score:   PHQ-2 ( 1999 [employer]) 12/13/2020   Q1: Little interest or pleasure in doing things 1   Q2: Feeling down, depressed or hopeless 1   PHQ-2 Score 2   Q1: Little interest or pleasure in doing things Several days   Q2: Feeling down, depressed or hopeless Several days   PHQ-2 Score 2       Abuse: Current or Past(Physical, Sexual or Emotional)- No  Do you feel safe in your environment? Yes        Social History     Tobacco Use     Smoking status: Never Smoker     Smokeless tobacco: Never Used   Substance Use Topics     Alcohol use: Yes     Comment: 1-2 times a month     If you drink alcohol do you typically have >3 drinks per day or >7 drinks per week? No    Alcohol Use 12/15/2020   Prescreen: >3 drinks/day or >7 drinks/week? -   Prescreen: >3 drinks/day or >7 drinks/week? No   No flowsheet data found.    Reviewed orders with patient. Reviewed health maintenance and updated orders accordingly - Yes      Reviewed and updated as needed this visit by clinical staff   Allergies  Meds              Reviewed and updated as needed this visit by Provider                Past Medical History:  "  Diagnosis Date     Anxiety      Corneal abrasion 2019     Family history of hyperthyroidism 3/12/2012     Glaucoma      PTSD (post-traumatic stress disorder) 11/2010    [employer] volunteer in [facility].     Sweat, sweating, excessive 3/12/2012     Past Surgical History:   Procedure Laterality Date     MYRINGOTOMY, INSERT TUBE BILATERAL, COMBINED       Current Outpatient Medications   Medication Sig Dispense Refill     Cyanocobalamin 2500 MCG CHEW Take by mouth daily       escitalopram (LEXAPRO) 20 MG tablet Take 1 tablet (20 mg) by mouth daily 90 tablet 1     hydrOXYzine (ATARAX) 25 MG tablet Take 1 tablet (25 mg) by mouth 3 times daily as needed for anxiety 30 tablet 3     multivitamin, therapeutic with minerals (MULTI-VITAMIN) TABS tablet Take 1 tablet by mouth daily       Vitamin D, Cholecalciferol, 1000 units CAPS Take by mouth daily           Review of Systems  CONSTITUTIONAL: NEGATIVE for fever, chills, change in weight  INTEGUMENTARY/SKIN: NEGATIVE for worrisome rashes, moles or lesions  EYES: NEGATIVE for vision changes or irritation  ENT: NEGATIVE for ear, mouth and throat problems  RESP: NEGATIVE for significant cough or SOB  CV: NEGATIVE for chest pain, palpitations or peripheral edema  GI: NEGATIVE for nausea, abdominal pain, heartburn, or change in bowel habits   male: negative for dysuria, hematuria, decreased urinary stream, erectile dysfunction, urethral discharge  MUSCULOSKELETAL: NEGATIVE for significant arthralgias or myalgia  NEURO: NEGATIVE for weakness, dizziness or paresthesias  PSYCHIATRIC: NEGATIVE for changes in mood or affect    OBJECTIVE:   /84 (BP Location: Right arm, Patient Position: Sitting)   Pulse 78   Temp 97  F (36.1  C) (Tympanic)   Ht 1.727 m (5' 8\")   Wt 98.9 kg (218 lb)   SpO2 97%   BMI 33.15 kg/m      Physical Exam  GENERAL: healthy, alert and no distress  EYES: Eyes grossly normal to inspection, PERRL and conjunctivae and sclerae normal  HENT: ear canals and " "TM's normal, nose and mouth without ulcers or lesions  NECK: no adenopathy, no asymmetry, masses, or scars and thyroid normal to palpation  RESP: lungs clear to auscultation - no rales, rhonchi or wheezes  CV: regular rate and rhythm, normal S1 S2, no S3 or S4, no murmur, click or rub, no peripheral edema and peripheral pulses strong  ABDOMEN: soft, nontender, no hepatosplenomegaly, no masses and bowel sounds normal  MS: no gross musculoskeletal defects noted, no edema  SKIN: no suspicious lesions or rashes  NEURO: Normal strength and tone, mentation intact and speech normal  PSYCH: mentation appears normal, affect normal/bright        ASSESSMENT/PLAN:   Assessment and Plan:     (Z00.00) Routine general medical examination at a health care facility  (primary encounter diagnosis)  Comment: Immun up to date.  Plan: Comprehensive metabolic panel, CBC with         platelets            (F41.9) Anxiety  Comment: increase lexapro from 10mg every day to 15mg for a few days, then 20mg every day.  Added prn atarax. F/u if still not doing well  Plan: escitalopram (LEXAPRO) 20 MG tablet,         hydrOXYzine (ATARAX) 25 MG tablet            (F32.0) Mild major depression (H)  Comment:   Plan: well controlled on lexapro    (Z11.4) Screening for HIV (human immunodeficiency virus)  Comment:   Plan: HIV Antigen Antibody Combo            (Z11.59) Encounter for hepatitis C screening test for low risk patient  Comment:   Plan: Hepatitis C antibody                Patient has been advised of split billing requirements and indicates understanding: Yes  COUNSELING:   Reviewed preventive health counseling, as reflected in patient instructions    Estimated body mass index is 33.15 kg/m  as calculated from the following:    Height as of this encounter: 1.727 m (5' 8\").    Weight as of this encounter: 98.9 kg (218 lb).         He reports that he has never smoked. He has never used smokeless tobacco.      Counseling Resources:  ATP IV " Guidelines  Pooled Cohorts Equation Calculator  FRAX Risk Assessment  ICSI Preventive Guidelines  Dietary Guidelines for Americans, 2010  USDA's MyPlate  ASA Prophylaxis  Lung CA Screening    TONY Knowles [facility]

## 2020-12-16 LAB
HCV AB SERPL QL IA: NONREACTIVE
HIV 1+2 AB+HIV1 P24 AG SERPL QL IA: NONREACTIVE

## 2020-12-17 NOTE — RESULT ENCOUNTER NOTE
It was a pleasure seeing you for your physical examination.  I wanted to get back to you with your test results.     I am happy to report that your CBC or complete blood count is normal with no signs of anemia, leukemia or platelet abnormalities. Your chemistry panel shows no signs of diabetes.  Your blood salts, kidney tests, liver tests, and proteins are all fine.    Your hepatitis C and HIV tests were negative.    Happy Holidays.    Marge Brock PA-C

## 2021-01-06 ENCOUNTER — MYC MEDICAL ADVICE (OUTPATIENT)
Dept: FAMILY MEDICINE | Facility: CLINIC | Age: 36
End: 2021-01-06

## 2021-01-07 ENCOUNTER — MYC MEDICAL ADVICE (OUTPATIENT)
Dept: FAMILY MEDICINE | Facility: CLINIC | Age: 36
End: 2021-01-07

## 2021-01-07 ENCOUNTER — NURSE TRIAGE (OUTPATIENT)
Dept: FAMILY MEDICINE | Facility: CLINIC | Age: 36
End: 2021-01-07

## 2021-01-07 ENCOUNTER — TRANSFERRED RECORDS (OUTPATIENT)
Dept: HEALTH INFORMATION MANAGEMENT | Facility: CLINIC | Age: 36
End: 2021-01-07

## 2021-01-07 DIAGNOSIS — K62.5 RECTAL BLEEDING: Primary | ICD-10-CM

## 2021-01-07 LAB
CREAT SERPL-MCNC: 1.08 MG/DL (ref 0.7–1.3)
GFR SERPL CREATININE-BSD FRML MDRD: >60 ML/MIN/1.73M2
GLUCOSE SERPL-MCNC: 97 MG/DL (ref 70–125)
INR PPP: 0.93 (ref 0.9–1.1)
POTASSIUM SERPL-SCNC: 4 MMOL/L (ref 3.5–5)

## 2021-01-07 NOTE — TELEPHONE ENCOUNTER
"(FYI to PCP; no action needed)  Patient will be seen in the ED today for spontaneous rectal bleeding lasting > 24 hours. (not associated with BM; last BM 1/4/21 normal consistency).     This bleeding awakened him yesterday. Large amount bright red, congealed plus liquid blood in underwear and on sheets.  Described \"large amount\" and had to take a bath.  Persisted throughout day, now with smears and trickles of liquid, bright red blood today.   Denies fatigue, weakness. \"maybe more tired due to weather\".  Appetite good. No fever.  Describes a sensation in abdomen over the past month as \"a muscle; maybe like a pulled runner's muscle\".      Reason for Disposition    MODERATE rectal bleeding (small blood clots, passing blood without stool, or toilet water turns red) more than once a day    Additional Information    Negative: Passed out (i.e., fainted, collapsed and was not responding)    Negative: Shock suspected (e.g., cold/pale/clammy skin, too weak to stand, low BP, rapid pulse)    Negative: Vomiting red blood or black (coffee ground) material    Negative: Sounds like a life-threatening emergency to the triager    Negative: Diarrhea is the main symptom    Negative: Rectal symptoms    Negative: SEVERE dizziness (e.g., unable to stand, requires support to walk, feels like passing out now)    Negative: SEVERE rectal bleeding (large blood clots; on and off, or constant bleeding)    Negative: Bloody, black, or tarry bowel movements    Negative: High-risk adult (e.g., prior surgery on aorta, abdominal aortic aneurysm)    Negative: Rectal foreign body (inserted or swallowed)    Answer Assessment - Initial Assessment Questions  1. APPEARANCE of BLOOD: \"What color is it?\" \"Is it passed separately, on the surface of the stool, or mixed in with the stool?\"       Not associated with BM.   2. AMOUNT: \"How much blood was passed?\"       In underwear/ goopy/congealed and some liquid and down leg. Soaked shorts and sheets. \"Had to " "take a bath\"  3. FREQUENCY: \"How many times has blood been passed with the stools?\"       Never  4. ONSET: \"When was the blood first seen in the stools?\" (Days or weeks)       Once, yesterday; NOW notices smears of blood on underwear.  5. DIARRHEA: \"Is there also some diarrhea?\" If so, ask: \"How many diarrhea stools were passed in past 24 hours?\"       non  6. CONSTIPATION: \"Do you have constipation?\" If so, \"How bad is it?\"      Sometimes.    7. RECURRENT SYMPTOMS: \"Have you had blood in your stools before?\" If so, ask: \"When was the last time?\" and \"What happened that time?\"       NO  8. BLOOD THINNERS: \"Do you take any blood thinners?\" (e.g., Coumadin/warfarin, Pradaxa/dabigatran, aspirin)      none  9. OTHER SYMPTOMS: \"Do you have any other symptoms?\"  (e.g., abdominal pain, vomiting, dizziness, fever)      Over past month, mild abdominal cramping. \"Feels like a muscular/runner's cramp\"    Protocols used: RECTAL BLEEDING-A-[place]    BM's vary a great deal from HARD to loose.  Last hard stool last week; Last BM was 1/4/21. Typical every other. Recent physical with normal labs (Hgb 15.1 mg/dL)  Due to persistent bleeding onto clothing, he will go to ED now.  Barbara Izaguirre RN on 1/7/2021 at 10:43 AM        "

## 2021-01-07 NOTE — TELEPHONE ENCOUNTER
Calling patient, as this needs to be triaged.  Barbara Izaguirre RN on 1/7/2021 at 10:20 AM    Per [facility] triage protocol, patient agrees to evaluation at the emergency dept.today. (see telephone encounter for triage details)  Barbara Izaguirre RN on 1/7/2021 at 10:42 AM

## 2021-01-08 NOTE — TELEPHONE ENCOUNTER
Please see My Chart message below and advise as appropriate.  See nurse triage encounter from 1/7/2021.    JUAN R GandaraN, RN  [facility]

## 2021-01-18 DIAGNOSIS — Z11.59 ENCOUNTER FOR SCREENING FOR OTHER VIRAL DISEASES: Primary | ICD-10-CM

## 2021-01-29 DIAGNOSIS — Z11.59 ENCOUNTER FOR SCREENING FOR OTHER VIRAL DISEASES: ICD-10-CM

## 2021-01-29 LAB
SARS-COV-2 RNA RESP QL NAA+PROBE: NORMAL
SPECIMEN SOURCE: NORMAL

## 2021-01-29 PROCEDURE — U0005 INFEC AGEN DETEC AMPLI PROBE: HCPCS | Performed by: INTERNAL MEDICINE

## 2021-01-29 PROCEDURE — U0003 INFECTIOUS AGENT DETECTION BY NUCLEIC ACID (DNA OR RNA); SEVERE ACUTE RESPIRATORY SYNDROME CORONAVIRUS 2 (SARS-COV-2) (CORONAVIRUS DISEASE [COVID-19]), AMPLIFIED PROBE TECHNIQUE, MAKING USE OF HIGH THROUGHPUT TECHNOLOGIES AS DESCRIBED BY CMS-2020-01-R: HCPCS | Performed by: INTERNAL MEDICINE

## 2021-01-29 PROCEDURE — 99207 PR NO CHARGE LOS: CPT

## 2021-01-30 LAB
LABORATORY COMMENT REPORT: NORMAL
SARS-COV-2 RNA RESP QL NAA+PROBE: NEGATIVE
SPECIMEN SOURCE: NORMAL

## 2021-02-01 ENCOUNTER — HOSPITAL ENCOUNTER (OUTPATIENT)
Facility: CLINIC | Age: 36
Discharge: HOME | End: 2021-02-01
Attending: INTERNAL MEDICINE | Admitting: INTERNAL MEDICINE
Payer: COMMERCIAL

## 2021-02-01 VITALS
BODY MASS INDEX: 29.55 KG/M2 | SYSTOLIC BLOOD PRESSURE: 114 MMHG | HEIGHT: 68 IN | RESPIRATION RATE: 14 BRPM | HEART RATE: 64 BPM | DIASTOLIC BLOOD PRESSURE: 93 MMHG | WEIGHT: 195 LBS | TEMPERATURE: 97 F | OXYGEN SATURATION: 93 %

## 2021-02-01 LAB — COLONOSCOPY: NORMAL

## 2021-02-01 PROCEDURE — 45380 COLONOSCOPY AND BIOPSY: CPT | Performed by: INTERNAL MEDICINE

## 2021-02-01 PROCEDURE — 258N000003 HC RX IP 258 OP 636: Performed by: INTERNAL MEDICINE

## 2021-02-01 PROCEDURE — 88305 TISSUE EXAM BY PATHOLOGIST: CPT | Mod: TC | Performed by: INTERNAL MEDICINE

## 2021-02-01 PROCEDURE — 250N000011 HC RX IP 250 OP 636: Performed by: INTERNAL MEDICINE

## 2021-02-01 PROCEDURE — 88305 TISSUE EXAM BY PATHOLOGIST: CPT | Mod: 26 | Performed by: PATHOLOGY

## 2021-02-01 PROCEDURE — G0500 MOD SEDAT ENDO SERVICE >5YRS: HCPCS | Performed by: INTERNAL MEDICINE

## 2021-02-01 RX ORDER — FENTANYL CITRATE 50 UG/ML
INJECTION, SOLUTION INTRAMUSCULAR; INTRAVENOUS PRN
Status: DISCONTINUED | OUTPATIENT
Start: 2021-02-01 | End: 2021-02-01 | Stop reason: HOSPADM

## 2021-02-01 ASSESSMENT — MIFFLIN-ST. JEOR: SCORE: 1799.01

## 2021-02-02 LAB — COPATH REPORT: NORMAL

## 2021-03-17 ENCOUNTER — IMMUNIZATION (OUTPATIENT)
Dept: NURSING | Facility: CLINIC | Age: 36
End: 2021-03-17
Payer: COMMERCIAL

## 2021-03-17 PROCEDURE — 0001A PR COVID VAC PFIZER DIL RECON 30 MCG/0.3 ML IM: CPT

## 2021-03-17 PROCEDURE — 91300 PR COVID VAC PFIZER DIL RECON 30 MCG/0.3 ML IM: CPT

## 2021-04-07 ENCOUNTER — IMMUNIZATION (OUTPATIENT)
Dept: NURSING | Facility: CLINIC | Age: 36
End: 2021-04-07
Attending: INTERNAL MEDICINE
Payer: COMMERCIAL

## 2021-04-07 PROCEDURE — 0002A PR COVID VAC PFIZER DIL RECON 30 MCG/0.3 ML IM: CPT

## 2021-04-07 PROCEDURE — 91300 PR COVID VAC PFIZER DIL RECON 30 MCG/0.3 ML IM: CPT

## 2021-05-20 DIAGNOSIS — F41.9 ANXIETY: ICD-10-CM

## 2021-05-21 RX ORDER — ESCITALOPRAM OXALATE 20 MG/1
20 TABLET ORAL DAILY
Qty: 90 TABLET | Refills: 1 | Status: SHIPPED | OUTPATIENT
Start: 2021-05-21 | End: 2021-12-21

## 2021-05-21 NOTE — TELEPHONE ENCOUNTER
Prescription approved per [facility] Refill Protocol.    Alayna Olivia RN  [facility]

## 2021-06-12 ENCOUNTER — COMMUNICATION - HEALTHEAST (OUTPATIENT)
Dept: SCHEDULING | Facility: CLINIC | Age: 36
End: 2021-06-12

## 2021-06-25 NOTE — TELEPHONE ENCOUNTER
"C/o nausea, vomiting, intermittent upper abdominal pain - gets some relief after vomiting. Vomiting x4 today starting @3pm. Last vomited 1 hr ago. Taking sips of water. No diarrhea. No constant abdominal pain. Temp unknown (no thermometer) Urine output 7 hrs ago. Advised home care.     Reason for Disposition    MILD or MODERATE vomiting (e.g., 1 - 5 times / day)    Additional Information    Negative: Shock suspected (e.g., cold/pale/clammy skin, too weak to stand, low BP, rapid pulse)    Negative: Difficult to awaken or acting confused (e.g., disoriented, slurred speech)    Negative: Sounds like a life-threatening emergency to the triager    Negative: Vomiting occurs only while coughing    Negative: [1] Pregnant < 20 Weeks AND [2] nausea/vomiting began in early pregnancy (i.e., 4-8 weeks pregnant)    Negative: Chest pain    Negative: Headache is main symptom    Negative: Vomiting (or Nausea) in a cancer patient who is currently (or recently) receiving chemotherapy or radiation therapy, or cancer patient who has metastatic or end-stage cancer and is receiving palliative care    Negative: [1] Vomiting AND [2] contains red blood or black (\"coffee ground\") material  (Exception: few red streaks in vomit that only happened once)    Negative: Severe pain in one eye    Negative: Recent head injury (within last 3 days)    Negative: Recent abdominal injury (within last 3 days)    Negative: [1] Insulin-dependent diabetes (Type I) AND [2] glucose > 400 mg/dl (22 mmol/l)    Negative: [1] SEVERE vomiting (e.g., 6 or more times/day) AND [2] present > 8 hours    Negative: [1] MODERATE vomiting (e.g., 3 - 5 times/day) AND [2] age > 60    Negative: Severe headache (e.g., excruciating) (Exception: similar to previous migraines)    Negative: High-risk adult (e.g., diabetes mellitus, brain tumor, V-P shunt, hernia)    Negative: [1] Drinking very little AND [2] dehydration suspected (e.g., no urine > 12 hours, very dry mouth, very " lightheaded)    Negative: Patient sounds very sick or weak to the triager    Negative: [1] Vomiting AND [2] abdomen looks much more swollen than usual    Negative: [1] Vomiting AND [2] contains bile (green color)    Negative: [1] Constant abdominal pain AND [2] present > 2 hours    Negative: [1] Fever > 101 F (38.3 C) AND [2] age > 60    Negative: [1] Fever > 100.0 F (37.8 C) AND [2] bedridden (e.g., nursing home patient, CVA, chronic illness, recovering from surgery)    Negative: [1] Fever > 100.0 F (37.8 C) AND [2] weak immune system (e.g., HIV positive, cancer chemo, splenectomy, organ transplant, chronic steroids)    Negative: Taking any of the following medications: digoxin (Lanoxin), lithium, theophylline, phenytoin (Dilantin)    Negative: [1] MILD or MODERATE vomiting AND [2] present > 48 hours (2 days) (Exception: mild vomiting with associated diarrhea)    Negative: Fever present > 3 days (72 hours)    Negative: Vomiting a prescription medication    Negative: [1] MILD vomiting with diarrhea AND [2] present > 5 days    Negative: Alcohol or drug abuse, known or suspected    Negative: Vomiting is a chronic symptom (recurrent or ongoing AND present > 4 weeks)    Negative: [1] SEVERE vomiting (e.g., 6 or more times/day, vomits everything) BUT [2] hydrated    Commented on: [1] Fever > 103 F (39.4 C) AND [2] not able to get the fever down using Fever Care Advice     No thermometer    Protocols used: VOMITING-A-AH

## 2021-07-28 ENCOUNTER — MYC MEDICAL ADVICE (OUTPATIENT)
Dept: FAMILY MEDICINE | Facility: CLINIC | Age: 36
End: 2021-07-28

## 2021-07-28 DIAGNOSIS — Z11.52 ENCOUNTER FOR SCREENING FOR COVID-19: Primary | ICD-10-CM

## 2021-07-28 NOTE — TELEPHONE ENCOUNTER
Please review MyChart message from patient.    Gene Coleman, [facility] on 7/28/2021 at 12:50 PM

## 2021-10-19 PROBLEM — F32.9 MAJOR DEPRESSION: Status: ACTIVE | Noted: 2018-12-20

## 2021-11-18 ENCOUNTER — E-VISIT (OUTPATIENT)
Dept: FAMILY MEDICINE | Facility: CLINIC | Age: 36
End: 2021-11-18
Payer: COMMERCIAL

## 2021-11-18 DIAGNOSIS — Z11.52 ENCOUNTER FOR SCREENING FOR COVID-19: Primary | ICD-10-CM

## 2021-11-18 DIAGNOSIS — Z20.822 CLOSE EXPOSURE TO 2019 NOVEL CORONAVIRUS: ICD-10-CM

## 2021-11-18 PROCEDURE — 99421 OL DIG E/M SVC 5-10 MIN: CPT | Performed by: PHYSICIAN ASSISTANT

## 2021-11-18 NOTE — PATIENT INSTRUCTIONS
"  Dear Suhas Hernandez,    Based on your exposure to COVID-19 (coronavirus), we would like to test you for this virus. I have placed an order for this test.The best time for testing is 5-7 days after the exposure.    How to schedule:  Go to your [employer] home page and scroll down to the section that says  You have an appointment that needs to be scheduled  and click the large green button that says  Schedule Now  and follow the steps to find the next available opening.     If you are unable to complete these [employer] scheduling steps, please call [phone] to schedule your testing.     Return to work/school/ guidance:   For people with high risk exposures outside the home    Please let your workplace manager and staffing office know when your quarantine ends.     We can not give you an exact date as it depends on the information below. You can calculate this on your own or work with your manager/staffing office to calculate this. (For example if you were exposed on 10/4, you would have to quarantine for 14 full days. That would be through 10/18. You could return on 10/19.)    Quarantine Guidelines:  Patients (\"contacts\") who have been in close prolonged contact of an infected person(s) (within six feet for at least 15 minutes within a 24 hour period), and remain asymptomatic should enter quarantine based on the following options:    14-day quarantine period (this remains the CDC recommendation for the greatest protection against spread of COVID-19) OR    Minimum 7-day quarantine with negative RT-PCR test collected on day 5 or later OR    10-day quarantine with no test  Quarantine Guideline exceptions are as follows:    People who have been fully vaccinated do not need to quarantine if the exposure was at least 2 weeks after the last vaccination. This includes vaccinated health care workers.    Not fully vaccinated and unvaccinated Individuals who work in health care, congregate care, or congregate living " should be off work for 14 days from their last date of exposure. Community activities for this group can be resumed based on options above. Fully vaccinated individuals in this group do not need to quarantine from work after exposure.    Not fully vaccinated and unvaccinated people whose high-risk exposure was a household member should always quarantine for 14 days from their last date of exposure. Fully vaccinated people in this category do not need to quarantine.    Not fully vaccinated or unvaccinated residents of congregate care and congregate living settings should always quarantine for 14 days from their last date of exposure. Fully vaccinated residents do not need to quarantine.  Note: If you have ongoing exposure to the covid positive person, this quarantine period may be more than 14 days. (For example, if you are continued to be exposed to your child who tested positive and cannot isolate from them, then the quarantine of 7-14 days can't start until your child is no longer contagious. This is typically 10 days from onset of the child's symptoms. So the total duration may be 17-24 days in this case.)    You should continue symptom monitoring until day 14 post-exposure. If you develop signs or symptoms of COVID-19, isolate and get tested (even if you have been tested already).    How to quarantine:   Stay home and away from others. Don't go to school or anywhere else. Generally quarantine means staying home from work but there are some exceptions to this. Please contact your workplace.  No hugging, kissing or shaking hands.  Don't let anyone visit.  Cover your mouth and nose with a mask, tissue or washcloth to avoid spreading germs.  Wash your hands and face often. Use soap and water.    What are the symptoms of COVID-19?  The most common symptoms are cough, fever and trouble breathing. Less common symptoms include headache, body aches, fatigue (feeling very tired), chills, sore throat, stuffy or runny nose,  diarrhea (loose poop), loss of taste or smell, belly pain, and nausea or vomiting (feeling sick to your stomach or throwing up).  After 14 days, if you have still don't have symptoms, you likely don't have this virus.  If you develop symptoms, follow these guidelines.  If you're normally healthy: Please start another eVisit.  If you have a serious health problem (like cancer, heart failure, an organ transplant or kidney disease): Call your specialty clinic. Let them know that you might have COVID-19.    Where can I get more information?  [facility] - About COVID-19: [email or web address]  CDC - What to Do If You're Sick: www.cdc.gov/coronavirus/2019-ncov/about/steps-when-sick.html  CDC - Ending Home Isolation: www.cdc.gov/coronavirus/2019-ncov/hcp/disposition-in-home-patients.html  CDC - Caring for Someone: www.cdc.gov/coronavirus/2019-ncov/if-you-are-sick/care-for-someone.html  [facility] clinical trials (COVID-19 research studies): [email or web address]-trials  Below are the COVID-19 hotlines at the [place] Department of Health ([facility]). Interpreters are available.  For health questions: Call 567-190-6480 or 1-752.417.2740 (7 a.m. to 7 p.m.)  For questions about schools and childcare: Call 230-041-0996 or 1-701.379.1188 (7 a.m. to 7 p.m.)

## 2021-11-19 ENCOUNTER — LAB (OUTPATIENT)
Dept: LAB | Facility: CLINIC | Age: 36
End: 2021-11-19
Attending: PHYSICIAN ASSISTANT
Payer: COMMERCIAL

## 2021-11-19 DIAGNOSIS — Z11.52 ENCOUNTER FOR SCREENING FOR COVID-19: ICD-10-CM

## 2021-11-19 PROCEDURE — U0003 INFECTIOUS AGENT DETECTION BY NUCLEIC ACID (DNA OR RNA); SEVERE ACUTE RESPIRATORY SYNDROME CORONAVIRUS 2 (SARS-COV-2) (CORONAVIRUS DISEASE [COVID-19]), AMPLIFIED PROBE TECHNIQUE, MAKING USE OF HIGH THROUGHPUT TECHNOLOGIES AS DESCRIBED BY CMS-2020-01-R: HCPCS

## 2021-11-19 PROCEDURE — U0005 INFEC AGEN DETEC AMPLI PROBE: HCPCS

## 2021-11-20 LAB — SARS-COV-2 RNA RESP QL NAA+PROBE: NEGATIVE

## 2021-11-22 NOTE — RESULT ENCOUNTER NOTE
Angelo,    Your COVID test was negative.   Let me know if you are not feeling well or have other concerns.    Marge Brock PA-C

## 2021-11-23 ENCOUNTER — IMMUNIZATION (OUTPATIENT)
Dept: NURSING | Facility: CLINIC | Age: 36
End: 2021-11-23
Payer: COMMERCIAL

## 2021-11-23 PROCEDURE — 0004A PR COVID VAC PFIZER DIL RECON 30 MCG/0.3 ML IM: CPT

## 2021-11-23 PROCEDURE — 91300 PR COVID VAC PFIZER DIL RECON 30 MCG/0.3 ML IM: CPT

## 2022-01-21 ASSESSMENT — ENCOUNTER SYMPTOMS
WEAKNESS: 0
ARTHRALGIAS: 0
MYALGIAS: 0
HEMATOCHEZIA: 0
CHILLS: 0
NERVOUS/ANXIOUS: 1
DIZZINESS: 0
JOINT SWELLING: 0
PARESTHESIAS: 0
HEMATURIA: 0
HEADACHES: 1
PALPITATIONS: 0
FREQUENCY: 0
SHORTNESS OF BREATH: 0
DYSURIA: 0
DIARRHEA: 0
SORE THROAT: 0
FEVER: 0
ABDOMINAL PAIN: 0
HEARTBURN: 0
EYE PAIN: 0
COUGH: 0
NAUSEA: 0
CONSTIPATION: 0

## 2022-01-22 ENCOUNTER — HEALTH MAINTENANCE LETTER (OUTPATIENT)
Age: 37
End: 2022-01-22

## 2022-01-27 ENCOUNTER — OFFICE VISIT (OUTPATIENT)
Dept: FAMILY MEDICINE | Facility: CLINIC | Age: 37
End: 2022-01-27
Payer: COMMERCIAL

## 2022-01-27 VITALS
BODY MASS INDEX: 34.4 KG/M2 | OXYGEN SATURATION: 96 % | WEIGHT: 227 LBS | SYSTOLIC BLOOD PRESSURE: 119 MMHG | DIASTOLIC BLOOD PRESSURE: 85 MMHG | HEIGHT: 68 IN | HEART RATE: 73 BPM | TEMPERATURE: 97.8 F | RESPIRATION RATE: 16 BRPM

## 2022-01-27 DIAGNOSIS — Z13.6 CARDIOVASCULAR SCREENING; LDL GOAL LESS THAN 160: ICD-10-CM

## 2022-01-27 DIAGNOSIS — G44.209 TENSION HEADACHE: ICD-10-CM

## 2022-01-27 DIAGNOSIS — Z83.49 FAMILY HISTORY OF HYPERTHYROIDISM: ICD-10-CM

## 2022-01-27 DIAGNOSIS — Z00.00 ROUTINE GENERAL MEDICAL EXAMINATION AT A HEALTH CARE FACILITY: Primary | ICD-10-CM

## 2022-01-27 DIAGNOSIS — F41.9 ANXIETY: ICD-10-CM

## 2022-01-27 DIAGNOSIS — F33.41 RECURRENT MAJOR DEPRESSIVE DISORDER, IN PARTIAL REMISSION (H): ICD-10-CM

## 2022-01-27 PROCEDURE — 99213 OFFICE O/P EST LOW 20 MIN: CPT | Mod: 25 | Performed by: PHYSICIAN ASSISTANT

## 2022-01-27 PROCEDURE — 99395 PREV VISIT EST AGE 18-39: CPT | Performed by: PHYSICIAN ASSISTANT

## 2022-01-27 PROCEDURE — 96127 BRIEF EMOTIONAL/BEHAV ASSMT: CPT | Performed by: PHYSICIAN ASSISTANT

## 2022-01-27 RX ORDER — ESCITALOPRAM OXALATE 20 MG/1
20 TABLET ORAL DAILY
Qty: 90 TABLET | Refills: 3 | Status: SHIPPED | OUTPATIENT
Start: 2022-01-27 | End: 2022-06-01

## 2022-01-27 RX ORDER — HYDROXYZINE HYDROCHLORIDE 25 MG/1
TABLET, FILM COATED ORAL
Qty: 90 TABLET | Refills: 3 | Status: SHIPPED | OUTPATIENT
Start: 2022-01-27 | End: 2022-05-06

## 2022-01-27 ASSESSMENT — PAIN SCALES - GENERAL: PAINLEVEL: NO PAIN (0)

## 2022-01-27 ASSESSMENT — MIFFLIN-ST. JEOR: SCORE: 1939.17

## 2022-01-27 ASSESSMENT — PATIENT HEALTH QUESTIONNAIRE - PHQ9: SUM OF ALL RESPONSES TO PHQ QUESTIONS 1-9: 4

## 2022-01-27 NOTE — PROGRESS NOTES
SUBJECTIVE:   CC: Suhas Hernandez is an 36 year old male who presents for preventative health visit.     Pt has FH of lewy body dementia in both grandfathers    He has HA and wonders if due to allergies  Pain in temples and back of neck  Denies sore throat or post nasal drip  No coughing or sneezing    Depression well controlled with lexapro    Immun up to date    Had a colon polyps last year.    Weight gain in the past year  He works out and swims  Doesn't eat out  Has diet coke          Patient has been advised of split billing requirements and indicates understanding: Yes  Healthy Habits:     Getting at least 3 servings of Calcium per day:  Yes    Bi-annual eye exam:  Yes    Dental care twice a year:  Yes    Sleep apnea or symptoms of sleep apnea:  None    Diet:  Regular (no restrictions)    Frequency of exercise:  4-5 days/week    Duration of exercise:  45-60 minutes    Taking medications regularly:  Yes    Barriers to taking medications:  None    Medication side effects:  None    PHQ-2 Total Score: 2    Additional concerns today:  Yes              Today's PHQ-2 Score:   PHQ-2 ( 1999 Pfizer) 1/27/2022   Q1: Little interest or pleasure in doing things 0   Q2: Feeling down, depressed or hopeless 0   PHQ-2 Score 0   PHQ-2 Total Score (12-17 Years)- Positive if 3 or more points; Administer PHQ-A if positive -   Q1: Little interest or pleasure in doing things -   Q2: Feeling down, depressed or hopeless -   PHQ-2 Score -       Abuse: Current or Past(Physical, Sexual or Emotional)- No  Do you feel safe in your environment? Yes    Have you ever done Advance Care Planning? (For example, a Health Directive, POLST, or a discussion with a medical provider or your loved ones about your wishes): Yes, advance care planning is on file.    Social History     Tobacco Use     Smoking status: Never Smoker     Smokeless tobacco: Never Used   Substance Use Topics     Alcohol use: Yes     Comment: 1-2 times a month     If you drink  alcohol do you typically have >3 drinks per day or >7 drinks per week? No    Last PSA: No results found for: PSA    Reviewed orders with patient. Reviewed health maintenance and updated orders accordingly -       Reviewed and updated as needed this visit by clinical staff  Tobacco  Allergies  Meds             Reviewed and updated as needed this visit by Provider               Past Medical History:   Diagnosis Date     Anxiety      Corneal abrasion 2019     Family history of hyperthyroidism 3/12/2012     Glaucoma      PTSD (post-traumatic stress disorder) 11/2010    [employer] volunteer in [facility].     Sweat, sweating, excessive 3/12/2012     Past Surgical History:   Procedure Laterality Date     COLONOSCOPY N/A 2/1/2021    Procedure: COLONOSCOPY, WITH POLYPECTOMY AND BIOPSY;  Surgeon: John Olivares MD;  Location:  GI     MYRINGOTOMY, INSERT TUBE BILATERAL, COMBINED       Current Outpatient Medications   Medication Sig Dispense Refill     Cyanocobalamin 2500 MCG CHEW Take by mouth daily       escitalopram (LEXAPRO) 20 MG tablet Take 1 tablet (20 mg) by mouth daily 90 tablet 0     hydrOXYzine (ATARAX) 25 MG tablet TAKE 1 TABLET(25 MG) BY MOUTH THREE TIMES DAILY AS NEEDED FOR ANXIETY 90 tablet 2     multivitamin, therapeutic with minerals (MULTI-VITAMIN) TABS tablet Take 1 tablet by mouth daily       Vitamin D, Cholecalciferol, 1000 units CAPS Take by mouth daily           Review of Systems  CONSTITUTIONAL: NEGATIVE for fever, chills, change in weight  INTEGUMENTARY/SKIN: NEGATIVE for worrisome rashes, moles or lesions  EYES: NEGATIVE for vision changes or irritation  ENT: NEGATIVE for ear, mouth and throat problems  RESP: NEGATIVE for significant cough or SOB  CV: NEGATIVE for chest pain, palpitations or peripheral edema  GI: NEGATIVE for nausea, abdominal pain, heartburn, or change in bowel habits   male: negative for dysuria, hematuria, decreased urinary stream, erectile dysfunction, urethral  "discharge  MUSCULOSKELETAL: NEGATIVE for significant arthralgias or myalgia  NEURO: NEGATIVE for weakness, dizziness or paresthesias  PSYCHIATRIC: NEGATIVE for changes in mood or affect    OBJECTIVE:   /85 (BP Location: Left arm, Patient Position: Chair, Cuff Size: Adult Large)   Pulse 73   Temp 97.8  F (36.6  C) (Temporal)   Resp 16   Ht 1.727 m (5' 8\")   Wt 103 kg (227 lb)   SpO2 96%   BMI 34.52 kg/m      Physical Exam  GENERAL: healthy, alert and no distress  EYES: Eyes grossly normal to inspection, PERRL and conjunctivae and sclerae normal  HENT: ear canals and TM's normal, nose and mouth without ulcers or lesions  NECK: no adenopathy, no asymmetry, masses, or scars and thyroid normal to palpation  RESP: lungs clear to auscultation - no rales, rhonchi or wheezes  CV: regular rate and rhythm, normal S1 S2, no S3 or S4, no murmur, click or rub, no peripheral edema and peripheral pulses strong  ABDOMEN: soft, nontender, no hepatosplenomegaly, no masses and bowel sounds normal  Testicular exam normal.  MS: no gross musculoskeletal defects noted, no edema  SKIN: no suspicious lesions or rashes  NEURO: Normal strength and tone, mentation intact and speech normal  PSYCH: mentation appears normal, affect normal/bright        ASSESSMENT/PLAN:   Assessment and Plan:     (Z00.00) Routine general medical examination at a health care facility  (primary encounter diagnosis)  Comment: immun up to date. He will return fasting for labs.  Colonoscopy due in 4 years.  Plan: Comprehensive metabolic panel (BMP + Alb, Alk         Phos, ALT, AST, Total. Bili, TP), CBC with         platelets            (G44.209) Tension headache  Comment:   Plan: [facility]            (F33.41) Recurrent major depressive disorder, in partial remission (H)  Comment:   Plan: stable, cont same.    (F41.9) Anxiety  Comment:   Plan: hydrOXYzine (ATARAX) 25 MG tablet, escitalopram        (LEXAPRO) 20 MG tablet            (Z83.49) " "Family history of hyperthyroidism  Comment:   Plan: TSH with free T4 reflex            (Z13.6) CARDIOVASCULAR SCREENING; LDL GOAL LESS THAN 160  Comment:   Plan: Lipid panel reflex to direct LDL Fasting                Patient has been advised of split billing requirements and indicates understanding: Yes    COUNSELING:   Reviewed preventive health counseling, as reflected in patient instructions    Estimated body mass index is 34.52 kg/m  as calculated from the following:    Height as of this encounter: 1.727 m (5' 8\").    Weight as of this encounter: 103 kg (227 lb).         He reports that he has never smoked. He has never used smokeless tobacco.      Counseling Resources:  ATP IV Guidelines  Pooled Cohorts Equation Calculator  FRAX Risk Assessment  ICSI Preventive Guidelines  Dietary Guidelines for Americans, 2010  USDA's MyPlate  ASA Prophylaxis  Lung CA Screening    Marge Brock PA-C  [facility]  "

## 2022-01-28 ENCOUNTER — LAB (OUTPATIENT)
Dept: LAB | Facility: CLINIC | Age: 37
End: 2022-01-28
Payer: COMMERCIAL

## 2022-01-28 DIAGNOSIS — Z13.6 CARDIOVASCULAR SCREENING; LDL GOAL LESS THAN 160: ICD-10-CM

## 2022-01-28 DIAGNOSIS — Z00.00 ROUTINE GENERAL MEDICAL EXAMINATION AT A HEALTH CARE FACILITY: ICD-10-CM

## 2022-01-28 DIAGNOSIS — Z83.49 FAMILY HISTORY OF HYPERTHYROIDISM: ICD-10-CM

## 2022-01-28 LAB
ALBUMIN SERPL-MCNC: 4.6 G/DL (ref 3.5–5)
ALP SERPL-CCNC: 60 U/L (ref 45–120)
ALT SERPL W P-5'-P-CCNC: 22 U/L (ref 0–45)
ANION GAP SERPL CALCULATED.3IONS-SCNC: 10 MMOL/L (ref 5–18)
AST SERPL W P-5'-P-CCNC: 23 U/L (ref 0–40)
BILIRUB SERPL-MCNC: 0.7 MG/DL (ref 0–1)
BUN SERPL-MCNC: 11 MG/DL (ref 8–22)
CALCIUM SERPL-MCNC: 9.6 MG/DL (ref 8.5–10.5)
CHLORIDE BLD-SCNC: 106 MMOL/L (ref 98–107)
CHOLEST SERPL-MCNC: 183 MG/DL
CO2 SERPL-SCNC: 25 MMOL/L (ref 22–31)
CREAT SERPL-MCNC: 0.95 MG/DL (ref 0.7–1.3)
ERYTHROCYTE [DISTWIDTH] IN BLOOD BY AUTOMATED COUNT: 13.4 % (ref 10–15)
FASTING STATUS PATIENT QL REPORTED: YES
GFR SERPL CREATININE-BSD FRML MDRD: >90 ML/MIN/1.73M2
GLUCOSE BLD-MCNC: 102 MG/DL (ref 70–125)
HCT VFR BLD AUTO: 42.7 % (ref 40–53)
HDLC SERPL-MCNC: 49 MG/DL
HGB BLD-MCNC: 14.2 G/DL (ref 13.3–17.7)
LDLC SERPL CALC-MCNC: 111 MG/DL
MCH RBC QN AUTO: 28.3 PG (ref 26.5–33)
MCHC RBC AUTO-ENTMCNC: 33.3 G/DL (ref 31.5–36.5)
MCV RBC AUTO: 85 FL (ref 78–100)
PLATELET # BLD AUTO: 328 10E3/UL (ref 150–450)
POTASSIUM BLD-SCNC: 4.5 MMOL/L (ref 3.5–5)
PROT SERPL-MCNC: 7.4 G/DL (ref 6–8)
RBC # BLD AUTO: 5.02 10E6/UL (ref 4.4–5.9)
SODIUM SERPL-SCNC: 141 MMOL/L (ref 136–145)
TRIGL SERPL-MCNC: 113 MG/DL
TSH SERPL DL<=0.005 MIU/L-ACNC: 0.53 UIU/ML (ref 0.3–5)
WBC # BLD AUTO: 4.9 10E3/UL (ref 4–11)

## 2022-01-28 PROCEDURE — 84443 ASSAY THYROID STIM HORMONE: CPT

## 2022-01-28 PROCEDURE — 80061 LIPID PANEL: CPT

## 2022-01-28 PROCEDURE — 36415 COLL VENOUS BLD VENIPUNCTURE: CPT

## 2022-01-28 PROCEDURE — 85027 COMPLETE CBC AUTOMATED: CPT

## 2022-01-28 PROCEDURE — 80053 COMPREHEN METABOLIC PANEL: CPT

## 2022-01-30 NOTE — RESULT ENCOUNTER NOTE
It was a pleasure seeing you for your physical examination.  I wanted to get back to you with your test results.     I am happy to report that your CBC or complete blood count is normal with no signs of anemia, leukemia or platelet abnormalities. Your chemistry panel shows no signs of diabetes.  Your blood salts, kidney tests, liver tests, and proteins are all fine.    Your TSH (thyroid test) was normal.    Your total cholesterol is 183 with the normal range being below 200.  Your HDL or good cholesterol is 49 with the normal range being above 50.  Your LDL or bad cholesterol is 111 with the normal range being below 160.    Marge Brock PA-C

## 2022-04-29 ENCOUNTER — MYC MEDICAL ADVICE (OUTPATIENT)
Dept: FAMILY MEDICINE | Facility: CLINIC | Age: 37
End: 2022-04-29
Payer: COMMERCIAL

## 2022-05-03 DIAGNOSIS — F41.9 ANXIETY: ICD-10-CM

## 2022-05-06 RX ORDER — HYDROXYZINE HYDROCHLORIDE 25 MG/1
TABLET, FILM COATED ORAL
Qty: 90 TABLET | Refills: 1 | Status: SHIPPED | OUTPATIENT
Start: 2022-05-06 | End: 2022-12-06

## 2022-05-06 NOTE — TELEPHONE ENCOUNTER
Prescription approved per [facility] Refill Protocol.  Vandana ARANA, Triage RN  [facility]

## 2022-06-01 ENCOUNTER — VIRTUAL VISIT (OUTPATIENT)
Dept: FAMILY MEDICINE | Facility: CLINIC | Age: 37
End: 2022-06-01
Payer: COMMERCIAL

## 2022-06-01 DIAGNOSIS — F41.9 ANXIETY: ICD-10-CM

## 2022-06-01 PROCEDURE — 99213 OFFICE O/P EST LOW 20 MIN: CPT | Mod: TEL | Performed by: INTERNAL MEDICINE

## 2022-06-01 RX ORDER — ESCITALOPRAM OXALATE 20 MG/1
20 TABLET ORAL DAILY
Qty: 90 TABLET | Refills: 3 | Status: SHIPPED | OUTPATIENT
Start: 2022-06-01 | End: 2023-01-05

## 2022-06-01 NOTE — PROGRESS NOTES
"Angelo is a 37 year old who is being evaluated via a billable telephone visit.      What phone number would you like to be contacted at? [phone]  How would you like to obtain your AVS? MyChart    Assessment & Plan     Anxiety  Patient doing well on his current regime.  Was previously followed by Marge Brock.  No complaints or side effects with the medication  - escitalopram (LEXAPRO) 20 MG tablet; Take 1 tablet (20 mg) by mouth daily     BMI:   Estimated body mass index is 34.52 kg/m  as calculated from the following:    Height as of 1/27/22: 1.727 m (5' 8\").    Weight as of 1/27/22: 103 kg (227 lb).       See Patient Instructions    No follow-ups on file.    Lopez Sprague MD  [facility]    Grayson Stephens is a 37 year old who presents for the following health issues patient doing well on Lexapro.  No side effects.  Would like a refill.  No hopelessness or helplessness    History of Present Illness       Reason for visit:  Med renewal/transfer from Marge Brock    He eats 2-3 servings of fruits and vegetables daily.He consumes 1 sweetened beverage(s) daily.He exercises with enough effort to increase his heart rate 30 to 60 minutes per day.  He exercises with enough effort to increase his heart rate 4 days per week.   He is taking medications regularly.         Chief Complaint   Patient presents with     Recheck Medication       Review of Systems   Constitutional, HEENT, cardiovascular, pulmonary, gi and gu systems are negative, except as otherwise noted.      Objective           Vitals:  No vitals were obtained today due to virtual visit.    Physical Exam   healthy, alert and no distress  PSYCH: Alert and oriented times 3; coherent speech, normal   rate and volume, able to articulate logical thoughts, able   to abstract reason, no tangential thoughts, no hallucinations   or delusions  His affect is normal  RESP: No cough, no audible wheezing, able to talk in full sentences  Remainder of exam " unable to be completed due to telephone visits    Lab on 01/28/2022   Component Date Value Ref Range Status     TSH 01/28/2022 0.53  0.30 - 5.00 uIU/mL Final     Cholesterol 01/28/2022 183  <=199 mg/dL Final     Triglycerides 01/28/2022 113  <=149 mg/dL Final     Direct Measure HDL 01/28/2022 49  >=40 mg/dL Final    HDL Cholesterol Reference Range:     0-2 years:   No reference ranges established for patients under 2 years old  at [facility] for lipid analytes.    2-8 years:  Greater than 45 mg/dL     18 years and older:   Female: Greater than or equal to 50 mg/dL   Male:   Greater than or equal to 40 mg/dL     LDL Cholesterol Calculated 01/28/2022 111  <=129 mg/dL Final     Patient Fasting > 8hrs? 01/28/2022 Yes   Final     Sodium 01/28/2022 141  136 - 145 mmol/L Final     Potassium 01/28/2022 4.5  3.5 - 5.0 mmol/L Final     Chloride 01/28/2022 106  98 - 107 mmol/L Final     Carbon Dioxide (CO2) 01/28/2022 25  22 - 31 mmol/L Final     Anion Gap 01/28/2022 10  5 - 18 mmol/L Final     Urea Nitrogen 01/28/2022 11  8 - 22 mg/dL Final     Creatinine 01/28/2022 0.95  0.70 - 1.30 mg/dL Final     Calcium 01/28/2022 9.6  8.5 - 10.5 mg/dL Final     Glucose 01/28/2022 102  70 - 125 mg/dL Final     Alkaline Phosphatase 01/28/2022 60  45 - 120 U/L Final     AST 01/28/2022 23  0 - 40 U/L Final     ALT 01/28/2022 22  0 - 45 U/L Final     Protein Total 01/28/2022 7.4  6.0 - 8.0 g/dL Final     Albumin 01/28/2022 4.6  3.5 - 5.0 g/dL Final     Bilirubin Total 01/28/2022 0.7  0.0 - 1.0 mg/dL Final     GFR Estimate 01/28/2022 >90  >60 mL/min/1.73m2 Final    Effective December 21, 2021 eGFRcr in adults is calculated using the 2021 CKD-EPI creatinine equation which includes age and gender (Rl baez al., NEJ, DOI: 10.1056/OTXFsw8587866)     WBC Count 01/28/2022 4.9  4.0 - 11.0 10e3/uL Final     RBC Count 01/28/2022 5.02  4.40 - 5.90 10e6/uL Final     Hemoglobin 01/28/2022 14.2  13.3 - 17.7 g/dL Final     Hematocrit  01/28/2022 42.7  40.0 - 53.0 % Final     MCV 01/28/2022 85  78 - 100 fL Final     MCH 01/28/2022 28.3  26.5 - 33.0 pg Final     MCHC 01/28/2022 33.3  31.5 - 36.5 g/dL Final     RDW 01/28/2022 13.4  10.0 - 15.0 % Final     Platelet Count 01/28/2022 328  150 - 450 10e3/uL Final               Phone call duration:5 minutes

## 2022-07-07 ENCOUNTER — LAB (OUTPATIENT)
Dept: LAB | Facility: CLINIC | Age: 37
End: 2022-07-07
Attending: FAMILY MEDICINE
Payer: COMMERCIAL

## 2022-07-07 DIAGNOSIS — Z20.822 ENCOUNTER FOR LABORATORY TESTING FOR COVID-19 VIRUS: ICD-10-CM

## 2022-07-07 PROCEDURE — U0005 INFEC AGEN DETEC AMPLI PROBE: HCPCS

## 2022-07-07 PROCEDURE — U0003 INFECTIOUS AGENT DETECTION BY NUCLEIC ACID (DNA OR RNA); SEVERE ACUTE RESPIRATORY SYNDROME CORONAVIRUS 2 (SARS-COV-2) (CORONAVIRUS DISEASE [COVID-19]), AMPLIFIED PROBE TECHNIQUE, MAKING USE OF HIGH THROUGHPUT TECHNOLOGIES AS DESCRIBED BY CMS-2020-01-R: HCPCS

## 2022-07-08 LAB — SARS-COV-2 RNA RESP QL NAA+PROBE: NEGATIVE

## 2022-09-03 ENCOUNTER — HEALTH MAINTENANCE LETTER (OUTPATIENT)
Age: 37
End: 2022-09-03

## 2022-09-30 ENCOUNTER — LAB (OUTPATIENT)
Dept: LAB | Facility: CLINIC | Age: 37
End: 2022-09-30
Attending: FAMILY MEDICINE
Payer: COMMERCIAL

## 2022-09-30 DIAGNOSIS — Z20.822 ENCOUNTER FOR LABORATORY TESTING FOR COVID-19 VIRUS: ICD-10-CM

## 2022-09-30 PROCEDURE — U0005 INFEC AGEN DETEC AMPLI PROBE: HCPCS

## 2022-09-30 PROCEDURE — U0003 INFECTIOUS AGENT DETECTION BY NUCLEIC ACID (DNA OR RNA); SEVERE ACUTE RESPIRATORY SYNDROME CORONAVIRUS 2 (SARS-COV-2) (CORONAVIRUS DISEASE [COVID-19]), AMPLIFIED PROBE TECHNIQUE, MAKING USE OF HIGH THROUGHPUT TECHNOLOGIES AS DESCRIBED BY CMS-2020-01-R: HCPCS

## 2022-10-01 LAB — SARS-COV-2 RNA RESP QL NAA+PROBE: NEGATIVE

## 2022-10-21 ENCOUNTER — VIRTUAL VISIT (OUTPATIENT)
Dept: FAMILY MEDICINE | Facility: CLINIC | Age: 37
End: 2022-10-21
Payer: COMMERCIAL

## 2022-10-21 DIAGNOSIS — F33.41 RECURRENT MAJOR DEPRESSIVE DISORDER, IN PARTIAL REMISSION (H): ICD-10-CM

## 2022-10-21 DIAGNOSIS — F41.9 ANXIETY: ICD-10-CM

## 2022-10-21 DIAGNOSIS — Z76.89 ENCOUNTER TO ESTABLISH CARE: Primary | ICD-10-CM

## 2022-10-21 PROCEDURE — 99213 OFFICE O/P EST LOW 20 MIN: CPT | Mod: TEL | Performed by: PHYSICIAN ASSISTANT

## 2022-10-21 ASSESSMENT — PATIENT HEALTH QUESTIONNAIRE - PHQ9
SUM OF ALL RESPONSES TO PHQ QUESTIONS 1-9: 5
10. IF YOU CHECKED OFF ANY PROBLEMS, HOW DIFFICULT HAVE THESE PROBLEMS MADE IT FOR YOU TO DO YOUR WORK, TAKE CARE OF THINGS AT HOME, OR GET ALONG WITH OTHER PEOPLE: SOMEWHAT DIFFICULT
SUM OF ALL RESPONSES TO PHQ QUESTIONS 1-9: 5

## 2022-10-21 NOTE — PROGRESS NOTES
Angelo is a 37 year old who is being evaluated via a billable telephone visit.      Assessment & Plan      Encounter to establish care  We will plan for annual physical in January.  Encouraged him to reach out to me sooner with any issues.    Recurrent major depressive disorder, in partial remission (H)  Anxiety    Continue Lexapro daily in addition to Atarax as needed.  States that he is due for medication refills upcoming.  He will contact the office when he is due.    Return in about 3 months (around 1/21/2023) for Routine preventive.    The likelihood of other entities in the differential is insufficient to justify any further testing for them at this time. This was explained to the patient. The patient was advised that persistent or worsening symptoms would require further evaluation. Patient advised to call the office and if unable to reach to go to the emergency room if they develop any new or worsening symptoms. Expressed understanding and agreement with above stated plan.       TONY Almaguer [facility] JAREN    Grayson Stephens is a 37 year old presenting for the following health issues:  Establish Care (Patient having a phone visit to establish care with a new provider and to have someone renew prescriptions.)    Presents today to establish care via a telephone call.  Currently in [place] on a work trip.  Works as a contractor .  Lives in [place].  Originally from [place].     History of anxiety/depression well-controlled on Lexapro 20 mg daily.  Takes hydroxyzine about once a week at bedtime.  No side effects from the medication. Happy with current regiment.     Reviewed his past medical history which included a colonoscopy last year following rectal bleeding.  Removed polyps.  Due every 4 years now.    Exercises regularly at [employer].  Due for physical in January.  Does not need medications refilled at this time.    History of Present Illness       Reason for  visit:  Transfer from [place]. Looking for new PCP    He eats 2-3 servings of fruits and vegetables daily.He consumes 2 sweetened beverage(s) daily.He exercises with enough effort to increase his heart rate 20 to 29 minutes per day.  He exercises with enough effort to increase his heart rate 3 or less days per week.   He is taking medications regularly.    Today's PHQ-9         PHQ-9 Total Score: 5    PHQ-9 Q9 Thoughts of better off dead/self-harm past 2 weeks :   Not at all    How difficult have these problems made it for you to do your work, take care of things at home, or get along with other people: Somewhat difficult       Review of Systems   Constitutional, HEENT, cardiovascular, pulmonary, GI, , musculoskeletal, neuro, skin, endocrine and psych systems are negative, except as otherwise noted.      Objective         Vitals:  No vitals were obtained today due to virtual visit.    Physical Exam   healthy, alert and no distress  PSYCH: Alert and oriented times 3; coherent speech, normal   rate and volume, able to articulate logical thoughts, able   to abstract reason, no tangential thoughts, no hallucinations   or delusions  His affect is normal  RESP: No cough, no audible wheezing, able to talk in full sentences  Remainder of exam unable to be completed due to telephone visits      Phone call duration: 6 minutes  Answers for HPI/ROS submitted by the patient on 10/14/2022  What is the reason for your visit today? : Transfer from [place]. Looking for new PCP  How many servings of fruits and vegetables do you eat daily?: 2-3  On average, how many sweetened beverages do you drink each day (Examples: soda, juice, sweet tea, etc.  Do NOT count diet or artificially sweetened beverages)?: 2  How many minutes a day do you exercise enough to make your heart beat faster?: 20 to 29  How many days a week do you exercise enough to make your heart beat faster?: 3 or less  How many days per week do you miss taking  your medication?: 0    Current Outpatient Medications   Medication Sig Dispense Refill     escitalopram (LEXAPRO) 20 MG tablet Take 1 tablet (20 mg) by mouth daily 90 tablet 3     hydrOXYzine (ATARAX) 25 MG tablet TAKE 1 TABLET(25 MG) BY MOUTH THREE TIMES DAILY AS NEEDED FOR ANXIETY 90 tablet 1     multivitamin w/minerals (THERA-VIT-M) tablet Take 1 tablet by mouth daily       Vitamin D, Cholecalciferol, 1000 units CAPS Take by mouth daily

## 2022-12-04 DIAGNOSIS — F41.9 ANXIETY: ICD-10-CM

## 2022-12-05 NOTE — TELEPHONE ENCOUNTER
Routing refill request to provider for review/approval because:  Routing to PCP to review    Sharlene Jordan RN

## 2022-12-06 RX ORDER — HYDROXYZINE HYDROCHLORIDE 25 MG/1
TABLET, FILM COATED ORAL
Qty: 90 TABLET | Refills: 0 | Status: SHIPPED | OUTPATIENT
Start: 2022-12-06 | End: 2023-03-14

## 2022-12-29 ASSESSMENT — ENCOUNTER SYMPTOMS
HEARTBURN: 1
CONSTIPATION: 1
HEADACHES: 1

## 2023-01-05 ENCOUNTER — OFFICE VISIT (OUTPATIENT)
Dept: FAMILY MEDICINE | Facility: CLINIC | Age: 38
End: 2023-01-05
Payer: COMMERCIAL

## 2023-01-05 VITALS
DIASTOLIC BLOOD PRESSURE: 62 MMHG | HEART RATE: 80 BPM | RESPIRATION RATE: 16 BRPM | SYSTOLIC BLOOD PRESSURE: 105 MMHG | OXYGEN SATURATION: 96 % | BODY MASS INDEX: 33.34 KG/M2 | WEIGHT: 220 LBS | TEMPERATURE: 98 F | HEIGHT: 68 IN

## 2023-01-05 DIAGNOSIS — Z00.00 ANNUAL PHYSICAL EXAM: Primary | ICD-10-CM

## 2023-01-05 DIAGNOSIS — F41.9 ANXIETY AND DEPRESSION: ICD-10-CM

## 2023-01-05 DIAGNOSIS — Z12.83 SKIN CANCER SCREENING: ICD-10-CM

## 2023-01-05 DIAGNOSIS — F32.A ANXIETY AND DEPRESSION: ICD-10-CM

## 2023-01-05 PROCEDURE — 99395 PREV VISIT EST AGE 18-39: CPT | Performed by: PHYSICIAN ASSISTANT

## 2023-01-05 RX ORDER — VENLAFAXINE HYDROCHLORIDE 75 MG/1
75 TABLET, EXTENDED RELEASE ORAL DAILY
Qty: 90 TABLET | Refills: 1 | Status: SHIPPED | OUTPATIENT
Start: 2023-01-05 | End: 2023-07-11

## 2023-01-05 ASSESSMENT — PAIN SCALES - GENERAL: PAINLEVEL: NO PAIN (0)

## 2023-01-05 NOTE — PROGRESS NOTES
SUBJECTIVE:   CC: Angelo is an 37 year old who presents for preventative health visit.     Overall he is doing well.  Here for his annual physical.  Continues to work [place].  Frequent travel.  Lives in [place].  Active with swimming at [employer].    Notes that he feels groggy/inability to lose weight on his Lexapro.  Currently on 20 mg.  Questions whether this treatment helps his anxiety/depression.  No other trials of SSRIs/SNRIs.    Sees dentist, eye doctor.  Family history of melanoma.  Does not see dermatology.      Healthy Habits:     Getting at least 3 servings of Calcium per day:  Yes    Bi-annual eye exam:  Yes    Dental care twice a year:  Yes    Sleep apnea or symptoms of sleep apnea:  None    Diet:  Regular (no restrictions)    Frequency of exercise:  2-3 days/week    Duration of exercise:  30-45 minutes    Taking medications regularly:  Yes    Barriers to taking medications:  None    Medication side effects:  None    PHQ-2 Total Score: 1    Additional concerns today:  Yes      Today's PHQ-2 Score:   PHQ-2 ( 1999 [employer]) 12/29/2022   Q1: Little interest or pleasure in doing things 1   Q2: Feeling down, depressed or hopeless 0   PHQ-2 Score 1   PHQ-2 Total Score (12-17 Years)- Positive if 3 or more points; Administer PHQ-A if positive -   Q1: Little interest or pleasure in doing things Several days   Q2: Feeling down, depressed or hopeless Not at all   PHQ-2 Score 1       Social History     Tobacco Use     Smoking status: Never     Smokeless tobacco: Never   Substance Use Topics     Alcohol use: Yes     Comment: 1-2 times a month     If you drink alcohol do you typically have >3 drinks per day or >7 drinks per week? No    Last PSA: No results found for: PSA    Reviewed orders with patient. Reviewed health maintenance and updated orders accordingly - Yes  Lab work is in process  Labs reviewed in EPIC  BP Readings from Last 3 Encounters:   01/05/23 105/62   01/27/22 119/85   02/01/21 (!) 114/93     Wt Readings from Last 3 Encounters:   01/05/23 99.8 kg (220 lb)   01/27/22 103 kg (227 lb)   02/01/21 88.5 kg (195 lb)            Patient Active Problem List   Diagnosis     Anxiety     PTSD (post-traumatic stress disorder)     CARDIOVASCULAR SCREENING; LDL GOAL LESS THAN 160     Sweat, sweating, excessive     Family history of hyperthyroidism     GERD (gastroesophageal reflux disease)     Glaucoma     Mild major depression (H)     Past Surgical History:   Procedure Laterality Date     COLONOSCOPY N/A 2/1/2021    Procedure: COLONOSCOPY, WITH POLYPECTOMY AND BIOPSY;  Surgeon: John Olivares MD;  Location:  GI     MYRINGOTOMY, INSERT TUBE BILATERAL, COMBINED         Social History     Tobacco Use     Smoking status: Never     Smokeless tobacco: Never   Substance Use Topics     Alcohol use: Yes     Comment: 1-2 times a month     Family History   Problem Relation Age of Onset     Depression Mother      Anxiety Disorder Mother      Breast Cancer Mother      Depression Father      Anxiety Disorder Father      Substance Abuse Father         Alcoholic     Parkinsonism Maternal Grandmother         diagnosed in her 40's     Diabetes Maternal Grandmother      Other Cancer Maternal Grandmother      Genetic Disorder Maternal Grandmother         Parkinson's Disease     Parkinsonism Maternal Grandfather         diagnosed in his 40's     Hypertension Maternal Grandfather      Genetic Disorder Maternal Grandfather         Parkinson's Disease     Melanoma Maternal Grandfather 60     Dementia Maternal Grandfather      Dementia Paternal Grandfather          Current Outpatient Medications   Medication Sig Dispense Refill     hydrOXYzine (ATARAX) 25 MG tablet TAKE ONE TABLET BY MOUTH THREE TIMES DAILY AS NEEDED FOR ANXIETY 90 tablet 0     multivitamin w/minerals (THERA-VIT-M) tablet Take 1 tablet by mouth daily       venlafaxine (EFFEXOR-ER) 75 MG 24 hr tablet Take 1 tablet (75 mg) by mouth daily 90 tablet 1     Vitamin D,  Cholecalciferol, 1000 units CAPS Take by mouth daily       Allergies   Allergen Reactions     No Known Allergies      Recent Labs   Lab Test 01/28/22  1018 01/07/21  1550 01/07/21  0000 12/15/20  0858 12/20/18  1506 10/05/18  0824 12/11/17  0950   A1C  --   --   --   --   --  5.5  --      --   --   --   --   --  83   HDL 49  --   --   --   --   --  58   TRIG 113  --   --   --   --   --  142   ALT 22  --   --  25 28  --  28   CR 0.95 1.08 1.08 0.91 0.83  --  0.93   GFRESTIMATED >90 >60 >60 >90 >90  --  >90   GFRESTBLACK  --  >60 >60 >90 >90  --  >90   POTASSIUM 4.5 4.0 4.0 4.2 3.9  --  4.1   TSH 0.53  --   --   --   --   --  0.76        Reviewed and updated as needed this visit by clinical staff   Tobacco  Allergies  Meds   Med Hx   Fam Hx  Soc Hx        Reviewed and updated as needed this visit by Provider   Tobacco  Allergies  Meds   Med Hx   Fam Hx         Past Medical History:   Diagnosis Date     Anxiety      Corneal abrasion 2019     Depressive disorder 2011    On Geberic Lexapro     Family history of hyperthyroidism 03/12/2012     Glaucoma      History of colonic polyps      PTSD (post-traumatic stress disorder) 11/2010    [employer] volunteer in [facility].     Sweat, sweating, excessive 03/12/2012      Past Surgical History:   Procedure Laterality Date     COLONOSCOPY N/A 2/1/2021    Procedure: COLONOSCOPY, WITH POLYPECTOMY AND BIOPSY;  Surgeon: John Olivares MD;  Location:  GI     MYRINGOTOMY, INSERT TUBE BILATERAL, COMBINED         CONSTITUTIONAL: NEGATIVE for fever, chills, change in weight  INTEGUMENTARY/SKIN: NEGATIVE for worrisome rashes, moles or lesions  EYES: NEGATIVE for vision changes or irritation  ENT: NEGATIVE for ear, mouth and throat problems  RESP: NEGATIVE for significant cough or SOB  CV: NEGATIVE for chest pain, palpitations or peripheral edema  GI: NEGATIVE for nausea, abdominal pain, heartburn, or change in bowel habits   male: negative for dysuria, hematuria,  "decreased urinary stream, erectile dysfunction, urethral discharge  MUSCULOSKELETAL: NEGATIVE for significant arthralgias or myalgia  NEURO: NEGATIVE for weakness, dizziness or paresthesias  PSYCHIATRIC: NEGATIVE for changes in mood or affect    OBJECTIVE:   /62 (BP Location: Left arm, Patient Position: Chair, Cuff Size: Adult Large)   Pulse 80   Temp 98  F (36.7  C) (Temporal)   Resp 16   Ht 1.727 m (5' 8\")   Wt 99.8 kg (220 lb)   SpO2 96%   BMI 33.45 kg/m      Physical Exam  GENERAL: healthy, alert and no distress  EYES: Eyes grossly normal to inspection, PERRL and conjunctivae and sclerae normal  HENT: ear canals and TM's normal, nose and mouth without ulcers or lesions  NECK: no adenopathy, no asymmetry, masses, or scars and thyroid normal to palpation  RESP: lungs clear to auscultation - no rales, rhonchi or wheezes  CV: regular rate and rhythm, normal S1 S2, no S3 or S4, no murmur, click or rub, no peripheral edema and peripheral pulses strong  ABDOMEN: soft, nontender, no hepatosplenomegaly, no masses and bowel sounds normal  MS: no gross musculoskeletal defects noted, no edema  SKIN: no suspicious lesions or rashes  NEURO: Normal strength and tone, mentation intact and speech normal  PSYCH: mentation appears normal, affect normal/bright      ASSESSMENT/PLAN:   Suhas was seen today for physical.    Diagnoses and all orders for this visit:    Annual physical exam    Annual physical blood work ordered today CBC, CMP, TSH and lipid panel.  We will contact him with results of the blood work and the plan going forward.  Physical in 1 year.  Healthy diet and exercise.    -     CBC with platelets and differential; Future  -     Comprehensive metabolic panel (BMP + Alb, Alk Phos, ALT, AST, Total. Bili, TP); Future  -     TSH with free T4 reflex; Future  -     Lipid panel reflex to direct LDL Fasting; Future    Anxiety and depression    Check labs today.  Direct switch to Effexor 75 mg.  Discussed side " "effects of switching from SSRIs to SNRIs.  Hopefully this improves his ability to lose weight as well as feel less groggy.  Discussed side effects of Effexor.  Plan to follow-up in 1 to 2 months, sooner if needed.    -     CBC with platelets and differential; Future  -     Comprehensive metabolic panel (BMP + Alb, Alk Phos, ALT, AST, Total. Bili, TP); Future  -     TSH with free T4 reflex; Future  -     venlafaxine (EFFEXOR-ER) 75 MG 24 hr tablet; Take 1 tablet (75 mg) by mouth daily    Skin cancer screening    Referral placed to dermatology for full body skin check.    -     Adult Dermatology Referral; Future      Patient has been advised of split billing requirements and indicates understanding: Yes      COUNSELING:   Reviewed preventive health counseling, as reflected in patient instructions       Regular exercise       Healthy diet/nutrition       Vision screening      BMI:   Estimated body mass index is 33.45 kg/m  as calculated from the following:    Height as of this encounter: 1.727 m (5' 8\").    Weight as of this encounter: 99.8 kg (220 lb).   Weight management plan: Discussed healthy diet and exercise guidelines      He reports that he has never smoked. He has never used smokeless tobacco.            The likelihood of other entities in the differential is insufficient to justify any further testing for them at this time. This was explained to the patient. The patient was advised that persistent or worsening symptoms would require further evaluation. Patient advised to call the office and if unable to reach to go to the emergency room if they develop any new or worsening symptoms. Expressed understanding and agreement with above stated plan.       Suhas Jimenez PA-C  [facility]  "

## 2023-01-05 NOTE — PATIENT INSTRUCTIONS
Labs ordered - fasting  [facility] for full body skin check    Lexapro to Effexor.   Direct switch - initially 1/2 tab of Effexor for first 3-4 days then full tablet. Follow-up in 1-2 months. Sooner if needed.

## 2023-01-08 ENCOUNTER — MYC MEDICAL ADVICE (OUTPATIENT)
Dept: FAMILY MEDICINE | Facility: CLINIC | Age: 38
End: 2023-01-08

## 2023-01-08 DIAGNOSIS — Z01.818 PRE-OP EXAM: Primary | ICD-10-CM

## 2023-01-10 ENCOUNTER — TRANSFERRED RECORDS (OUTPATIENT)
Dept: HEALTH INFORMATION MANAGEMENT | Facility: CLINIC | Age: 38
End: 2023-01-10
Payer: COMMERCIAL

## 2023-01-11 ENCOUNTER — LAB (OUTPATIENT)
Dept: LAB | Facility: CLINIC | Age: 38
End: 2023-01-11
Payer: COMMERCIAL

## 2023-01-11 ENCOUNTER — APPOINTMENT (OUTPATIENT)
Dept: LAB | Facility: CLINIC | Age: 38
End: 2023-01-11
Payer: COMMERCIAL

## 2023-01-11 DIAGNOSIS — F32.A ANXIETY AND DEPRESSION: ICD-10-CM

## 2023-01-11 DIAGNOSIS — F41.9 ANXIETY AND DEPRESSION: ICD-10-CM

## 2023-01-11 DIAGNOSIS — Z00.00 ANNUAL PHYSICAL EXAM: ICD-10-CM

## 2023-01-11 DIAGNOSIS — Z01.818 PRE-OP EXAM: ICD-10-CM

## 2023-01-11 LAB
ALBUMIN SERPL BCG-MCNC: 4.6 G/DL (ref 3.5–5.2)
ALP SERPL-CCNC: 64 U/L (ref 40–129)
ALT SERPL W P-5'-P-CCNC: 19 U/L (ref 10–50)
ANION GAP SERPL CALCULATED.3IONS-SCNC: 13 MMOL/L (ref 7–15)
AST SERPL W P-5'-P-CCNC: 22 U/L (ref 10–50)
BASOPHILS # BLD AUTO: 0 10E3/UL (ref 0–0.2)
BASOPHILS NFR BLD AUTO: 0 %
BILIRUB SERPL-MCNC: 0.5 MG/DL
BUN SERPL-MCNC: 11.8 MG/DL (ref 6–20)
CALCIUM SERPL-MCNC: 9.3 MG/DL (ref 8.6–10)
CHLORIDE SERPL-SCNC: 104 MMOL/L (ref 98–107)
CHOLEST SERPL-MCNC: 197 MG/DL
CREAT SERPL-MCNC: 0.98 MG/DL (ref 0.67–1.17)
DEPRECATED HCO3 PLAS-SCNC: 24 MMOL/L (ref 22–29)
EOSINOPHIL # BLD AUTO: 0.2 10E3/UL (ref 0–0.7)
EOSINOPHIL NFR BLD AUTO: 3 %
ERYTHROCYTE [DISTWIDTH] IN BLOOD BY AUTOMATED COUNT: 13.5 % (ref 10–15)
GFR SERPL CREATININE-BSD FRML MDRD: >90 ML/MIN/1.73M2
GLUCOSE SERPL-MCNC: 105 MG/DL (ref 70–99)
HCT VFR BLD AUTO: 43.4 % (ref 40–53)
HDLC SERPL-MCNC: 55 MG/DL
HGB BLD-MCNC: 14.5 G/DL (ref 13.3–17.7)
IMM GRANULOCYTES # BLD: 0.1 10E3/UL
IMM GRANULOCYTES NFR BLD: 1 %
LDLC SERPL CALC-MCNC: 113 MG/DL
LYMPHOCYTES # BLD AUTO: 2 10E3/UL (ref 0.8–5.3)
LYMPHOCYTES NFR BLD AUTO: 29 %
MCH RBC QN AUTO: 28.1 PG (ref 26.5–33)
MCHC RBC AUTO-ENTMCNC: 33.4 G/DL (ref 31.5–36.5)
MCV RBC AUTO: 84 FL (ref 78–100)
MONOCYTES # BLD AUTO: 0.7 10E3/UL (ref 0–1.3)
MONOCYTES NFR BLD AUTO: 10 %
NEUTROPHILS # BLD AUTO: 3.9 10E3/UL (ref 1.6–8.3)
NEUTROPHILS NFR BLD AUTO: 57 %
NONHDLC SERPL-MCNC: 142 MG/DL
PLATELET # BLD AUTO: 324 10E3/UL (ref 150–450)
POTASSIUM SERPL-SCNC: 4.2 MMOL/L (ref 3.4–5.3)
PROT SERPL-MCNC: 7.4 G/DL (ref 6.4–8.3)
RBC # BLD AUTO: 5.16 10E6/UL (ref 4.4–5.9)
SARS-COV-2 RNA RESP QL NAA+PROBE: NEGATIVE
SODIUM SERPL-SCNC: 141 MMOL/L (ref 136–145)
TRIGL SERPL-MCNC: 145 MG/DL
TSH SERPL DL<=0.005 MIU/L-ACNC: 0.61 UIU/ML (ref 0.3–4.2)
WBC # BLD AUTO: 6.8 10E3/UL (ref 4–11)

## 2023-01-11 PROCEDURE — U0005 INFEC AGEN DETEC AMPLI PROBE: HCPCS

## 2023-01-11 PROCEDURE — 80061 LIPID PANEL: CPT

## 2023-01-11 PROCEDURE — 36415 COLL VENOUS BLD VENIPUNCTURE: CPT

## 2023-01-11 PROCEDURE — U0003 INFECTIOUS AGENT DETECTION BY NUCLEIC ACID (DNA OR RNA); SEVERE ACUTE RESPIRATORY SYNDROME CORONAVIRUS 2 (SARS-COV-2) (CORONAVIRUS DISEASE [COVID-19]), AMPLIFIED PROBE TECHNIQUE, MAKING USE OF HIGH THROUGHPUT TECHNOLOGIES AS DESCRIBED BY CMS-2020-01-R: HCPCS

## 2023-01-11 PROCEDURE — 80050 GENERAL HEALTH PANEL: CPT

## 2023-01-12 NOTE — RESULT ENCOUNTER NOTE
Darien Stephens,    I hope the new medication is treating you well. COVID-19 test was negative.    Blood count stable no signs of anemia.  Thyroid function stable.  Electrolytes, kidney function, and liver function are all normal.    Slight increase in your cholesterol this year.  Nothing to the point where you would need medication but something to focus on in regards to diet and exercise.  This goes hand-in-hand with a small increase in your glucose level as well.  I will attach diet suggestions below.  Happy to get good exercise with swimming.    Cut back on the amount of fat and cholesterol in your meals  Eat more fresh vegetables and fruits  Eat lean proteins such as fish, poultry, beans, and peas  Eat less red meat and processed meats  Use low-fat dairy products  Use vegetable and nut oils in limited amounts  Limit sweets and processed foods like chips, cookies, and baked goods  Limit sugar-sweetened beverages you drink  Limit how often you eat out  Limit alcohol    Let me know if you have any questions or concerns,     TONY Almaguer [facility]

## 2023-03-07 ENCOUNTER — VIRTUAL VISIT (OUTPATIENT)
Dept: FAMILY MEDICINE | Facility: CLINIC | Age: 38
End: 2023-03-07
Payer: COMMERCIAL

## 2023-03-07 DIAGNOSIS — F41.9 ANXIETY: Primary | ICD-10-CM

## 2023-03-07 PROCEDURE — 99213 OFFICE O/P EST LOW 20 MIN: CPT | Mod: 93 | Performed by: PHYSICIAN ASSISTANT

## 2023-03-07 NOTE — PROGRESS NOTES
{PROVIDER CHARTING PREFERENCE:517759}    Subjective   Angelo is a 37 year old{ACCOMPANIED BY STATEMENT (Optional):077540}, presenting for the following health issues:  Recheck Medication (2 mo f/u)      History of Present Illness       Reason for visit:  Meds review    He eats 2-3 servings of fruits and vegetables daily.He consumes 2 sweetened beverage(s) daily.He exercises with enough effort to increase his heart rate 30 to 60 minutes per day.  He exercises with enough effort to increase his heart rate 4 days per week.   He is taking medications regularly.       {SUPERLIST (Optional):446295}  {additonal problems for provider to add (Optional):725510}    Review of Systems   {ROS COMP (Optional):136267}      Objective    There were no vitals taken for this visit.  There is no height or weight on file to calculate BMI.  Physical Exam   {Exam List (Optional):470287}    {Diagnostic Test Results (Optional):700485}    {AMBULATORY ATTESTATION (Optional):984613}

## 2023-03-07 NOTE — PROGRESS NOTES
Angelo is a 37 year old who is being evaluated via a billable telephone visit.      What phone number would you like to be contacted at? [phone]  How would you like to obtain your AVS? b-datum    Distant Location (provider location):  On-site    Assessment & Plan     Anxiety  We will continue Effexor 75 mg daily.  He will keep posted in 1 month via b-datum message, to discuss option to increase Effexor 150 mg daily.  Continue healthy exercise/diet.  Close follow-up with dermatology.  Comfortable with plan.      15 minutes spent on the date of the encounter doing chart review, review of test results, interpretation of tests, patient visit and documentation        Return in about 4 months (around 7/7/2023) for Follow up.    The likelihood of other entities in the differential is insufficient to justify any further testing for them at this time. This was explained to the patient. The patient was advised that persistent or worsening symptoms would require further evaluation. Patient advised to call the office and if unable to reach to go to the emergency room if they develop any new or worsening symptoms. Expressed understanding and agreement with above stated plan.     Suhas Jimenez PA-C  [facility]   Angelo is a 37 year old presenting for the following health issues:  Recheck Medication (2 mo f/u)    Phone call today for follow-up in regards to medication management.    Switch from Lexapro 20 mg to Effexor 75 mg at last visit.  Has noted an improvement in his daily energy.  Less groggy feeling.  Anxiety more or less controlled, questions whether we may need to increase this in the future.  Has noted weight loss benefit already.    In the interim was referred to dermatology.  Has follow-up for this week as they took biopsies.  Confirmed squamous cell.  Yearly follow-up recommended per dermatology.  Family history of melanoma.    History of Present Illness       Reason for visit:   Meds review    He eats 2-3 servings of fruits and vegetables daily.He consumes 2 sweetened beverage(s) daily.He exercises with enough effort to increase his heart rate 30 to 60 minutes per day.  He exercises with enough effort to increase his heart rate 4 days per week.   He is taking medications regularly.       Review of Systems   Constitutional, HEENT, cardiovascular, pulmonary, GI, , musculoskeletal, neuro, skin, endocrine and psych systems are negative, except as otherwise noted.      Objective           Vitals:  No vitals were obtained today due to virtual visit.    Physical Exam   healthy, alert and no distress  PSYCH: Alert and oriented times 3; coherent speech, normal   rate and volume, able to articulate logical thoughts, able   to abstract reason, no tangential thoughts, no hallucinations   or delusions  His affect is normal  RESP: No cough, no audible wheezing, able to talk in full sentences  Remainder of exam unable to be completed due to telephone visits      Phone call duration: 10 minutes

## 2023-03-13 DIAGNOSIS — F41.9 ANXIETY: ICD-10-CM

## 2023-03-14 RX ORDER — HYDROXYZINE HYDROCHLORIDE 25 MG/1
TABLET, FILM COATED ORAL
Qty: 90 TABLET | Refills: 1 | Status: SHIPPED | OUTPATIENT
Start: 2023-03-14 | End: 2023-08-11

## 2023-03-28 ENCOUNTER — MYC MEDICAL ADVICE (OUTPATIENT)
Dept: FAMILY MEDICINE | Facility: CLINIC | Age: 38
End: 2023-03-28
Payer: COMMERCIAL

## 2023-03-28 DIAGNOSIS — F33.41 RECURRENT MAJOR DEPRESSIVE DISORDER, IN PARTIAL REMISSION (H): ICD-10-CM

## 2023-03-28 DIAGNOSIS — F41.9 ANXIETY: Primary | ICD-10-CM

## 2023-03-28 RX ORDER — VENLAFAXINE HYDROCHLORIDE 150 MG/1
150 TABLET, EXTENDED RELEASE ORAL DAILY
Qty: 30 TABLET | Refills: 2 | Status: SHIPPED | OUTPATIENT
Start: 2023-03-28 | End: 2023-07-10

## 2023-07-10 DIAGNOSIS — F33.41 RECURRENT MAJOR DEPRESSIVE DISORDER, IN PARTIAL REMISSION (H): ICD-10-CM

## 2023-07-10 DIAGNOSIS — F41.9 ANXIETY: ICD-10-CM

## 2023-07-10 RX ORDER — VENLAFAXINE HYDROCHLORIDE 150 MG/1
150 TABLET, EXTENDED RELEASE ORAL DAILY
Qty: 90 TABLET | Refills: 1 | Status: SHIPPED | OUTPATIENT
Start: 2023-07-10 | End: 2023-08-11

## 2023-07-10 ASSESSMENT — ANXIETY QUESTIONNAIRES
6. BECOMING EASILY ANNOYED OR IRRITABLE: SEVERAL DAYS
1. FEELING NERVOUS, ANXIOUS, OR ON EDGE: SEVERAL DAYS
IF YOU CHECKED OFF ANY PROBLEMS ON THIS QUESTIONNAIRE, HOW DIFFICULT HAVE THESE PROBLEMS MADE IT FOR YOU TO DO YOUR WORK, TAKE CARE OF THINGS AT HOME, OR GET ALONG WITH OTHER PEOPLE: SOMEWHAT DIFFICULT
7. FEELING AFRAID AS IF SOMETHING AWFUL MIGHT HAPPEN: SEVERAL DAYS
5. BEING SO RESTLESS THAT IT IS HARD TO SIT STILL: SEVERAL DAYS
2. NOT BEING ABLE TO STOP OR CONTROL WORRYING: SEVERAL DAYS
GAD7 TOTAL SCORE: 7
GAD7 TOTAL SCORE: 7
4. TROUBLE RELAXING: SEVERAL DAYS
3. WORRYING TOO MUCH ABOUT DIFFERENT THINGS: SEVERAL DAYS

## 2023-07-10 ASSESSMENT — PATIENT HEALTH QUESTIONNAIRE - PHQ9
SUM OF ALL RESPONSES TO PHQ QUESTIONS 1-9: 8
10. IF YOU CHECKED OFF ANY PROBLEMS, HOW DIFFICULT HAVE THESE PROBLEMS MADE IT FOR YOU TO DO YOUR WORK, TAKE CARE OF THINGS AT HOME, OR GET ALONG WITH OTHER PEOPLE: SOMEWHAT DIFFICULT
SUM OF ALL RESPONSES TO PHQ QUESTIONS 1-9: 8

## 2023-07-10 NOTE — TELEPHONE ENCOUNTER
LOV 3-7-2023 Barbara (virtual) follow up 4 months  No future OV scheduled    Pharm note given    Mychart sent to patient    Brittney Chicas RT (R)

## 2023-07-11 ENCOUNTER — VIRTUAL VISIT (OUTPATIENT)
Dept: FAMILY MEDICINE | Facility: CLINIC | Age: 38
End: 2023-07-11
Payer: COMMERCIAL

## 2023-07-11 DIAGNOSIS — F41.9 ANXIETY: Primary | ICD-10-CM

## 2023-07-11 PROCEDURE — 99213 OFFICE O/P EST LOW 20 MIN: CPT | Mod: 95 | Performed by: PHYSICIAN ASSISTANT

## 2023-07-11 ASSESSMENT — PATIENT HEALTH QUESTIONNAIRE - PHQ9
10. IF YOU CHECKED OFF ANY PROBLEMS, HOW DIFFICULT HAVE THESE PROBLEMS MADE IT FOR YOU TO DO YOUR WORK, TAKE CARE OF THINGS AT HOME, OR GET ALONG WITH OTHER PEOPLE: SOMEWHAT DIFFICULT
SUM OF ALL RESPONSES TO PHQ QUESTIONS 1-9: 8

## 2023-07-11 ASSESSMENT — ANXIETY QUESTIONNAIRES: GAD7 TOTAL SCORE: 7

## 2023-07-11 NOTE — PROGRESS NOTES
"Angelo is a 38 year old who is being evaluated via a billable telephone visit.      What phone number would you like to be contacted at? [phone]  How would you like to obtain your AVS? MyChart    Distant Location (provider location):  On-site    Assessment & Plan     Anxiety  Continue Effexor 150 mg daily.  Plan for follow-up in January for routine annual physical.  Encouraged him to contact me sooner if needed.    15 minutes spent by me on the date of the encounter doing chart review, review of test results, interpretation of tests, patient visit and documentation        BMI:   Estimated body mass index is 33.45 kg/m  as calculated from the following:    Height as of 1/5/23: 1.727 m (5' 8\").    Weight as of 1/5/23: 99.8 kg (220 lb).   Weight management plan: Discussed healthy diet and exercise guidelines    The likelihood of other entities in the differential is insufficient to justify any further testing for them at this time. This was explained to the patient. The patient was advised that persistent or worsening symptoms would require further evaluation. Patient advised to call the office and if unable to reach to go to the emergency room if they develop any new or worsening symptoms. Expressed understanding and agreement with above stated plan.     Suhas Jimenez PA-C  [facility]    Grayson Stephens is a 38 year old, presenting for the following health issues:  Recheck Medication    Here today for anxiety follow-up.  At last visit in March started him on Effexor 75 mg.  Sent a [employer] message about getting benefit from medication but wonders if the dose needed to be higher.  Increase to 150 mg daily.  Medication going very well.  No side effects.  Wishes to continue.  Uses Atarax as needed.    History of Present Illness       Mental Health Follow-up:  Patient presents to follow-up on Depression & Anxiety.Patient's depression since last visit has been:  Better  The patient is not " "having other symptoms associated with depression.  Patient's anxiety since last visit has been:  Better  The patient is not having other symptoms associated with anxiety.  Any significant life events: No  Patient is not feeling anxious or having panic attacks.  Patient has no concerns about alcohol or drug use.    He eats 2-3 servings of fruits and vegetables daily.He consumes 1 sweetened beverage(s) daily.He exercises with enough effort to increase his heart rate 30 to 60 minutes per day.  He exercises with enough effort to increase his heart rate 4 days per week.   He is taking medications regularly.    Today's PHQ-9         PHQ-9 Total Score: 8    PHQ-9 Q9 Thoughts of better off dead/self-harm past 2 weeks :   Not at all    How difficult have these problems made it for you to do your work, take care of things at home, or get along with other people: Somewhat difficult  Today's LAUREN-7 Score: 7        Review of Systems   Constitutional, HEENT, cardiovascular, pulmonary, GI, , musculoskeletal, neuro, skin, endocrine and psych systems are negative, except as otherwise noted.      Objective    Vitals - Patient Reported  Weight (Patient Reported): 99.8 kg (220 lb)  Height (Patient Reported): 172.7 cm (5' 8\")  BMI (Based on Pt Reported Ht/Wt): 33.45  Pain Score: No Pain (0)    Physical Exam   healthy, alert and no distress  PSYCH: Alert and oriented times 3; coherent speech, normal   rate and volume, able to articulate logical thoughts, able   to abstract reason, no tangential thoughts, no hallucinations   or delusions  His affect is normal  RESP: No cough, no audible wheezing, able to talk in full sentences  Remainder of exam unable to be completed due to telephone visits            Phone call duration: 5 minutes    "

## 2023-07-16 ENCOUNTER — MYC MEDICAL ADVICE (OUTPATIENT)
Dept: FAMILY MEDICINE | Facility: CLINIC | Age: 38
End: 2023-07-16
Payer: COMMERCIAL

## 2023-08-11 DIAGNOSIS — F33.41 RECURRENT MAJOR DEPRESSIVE DISORDER, IN PARTIAL REMISSION (H): ICD-10-CM

## 2023-08-11 DIAGNOSIS — F41.9 ANXIETY: ICD-10-CM

## 2023-08-11 RX ORDER — HYDROXYZINE HYDROCHLORIDE 25 MG/1
TABLET, FILM COATED ORAL
Qty: 90 TABLET | Refills: 1 | Status: SHIPPED | OUTPATIENT
Start: 2023-08-11 | End: 2024-01-24

## 2023-08-11 RX ORDER — VENLAFAXINE HYDROCHLORIDE 150 MG/1
150 TABLET, EXTENDED RELEASE ORAL DAILY
Qty: 90 TABLET | Refills: 1 | Status: SHIPPED | OUTPATIENT
Start: 2023-08-11 | End: 2024-01-16

## 2023-08-11 NOTE — TELEPHONE ENCOUNTER
Change to [facility] mail order pharmacy, new Rx's are needed    LOV 7- Barbara (virtual)    Appointments in Next Year      Jan 11, 2024  2:00 PM  (Arrive by 1:40 PM)  Preventative Adult Visit with Suhas Jimenez PA-C  [facility] ([facility] ) [phone]            Brittney Chicas RT (R)

## 2023-09-12 ENCOUNTER — IMMUNIZATION (OUTPATIENT)
Dept: NURSING | Facility: CLINIC | Age: 38
End: 2023-09-12
Payer: COMMERCIAL

## 2023-09-12 PROCEDURE — 90471 IMMUNIZATION ADMIN: CPT

## 2023-09-12 PROCEDURE — 90686 IIV4 VACC NO PRSV 0.5 ML IM: CPT

## 2023-11-07 ENCOUNTER — IMMUNIZATION (OUTPATIENT)
Dept: NURSING | Facility: CLINIC | Age: 38
End: 2023-11-07
Payer: COMMERCIAL

## 2023-11-07 PROCEDURE — 91320 SARSCV2 VAC 30MCG TRS-SUC IM: CPT

## 2023-11-07 PROCEDURE — 90480 ADMN SARSCOV2 VAC 1/ONLY CMP: CPT

## 2024-01-16 DIAGNOSIS — F41.9 ANXIETY: ICD-10-CM

## 2024-01-16 DIAGNOSIS — F33.41 RECURRENT MAJOR DEPRESSIVE DISORDER, IN PARTIAL REMISSION (H): ICD-10-CM

## 2024-01-17 RX ORDER — VENLAFAXINE HYDROCHLORIDE 150 MG/1
150 TABLET, EXTENDED RELEASE ORAL DAILY
Qty: 90 TABLET | Refills: 1 | Status: SHIPPED | OUTPATIENT
Start: 2024-01-17 | End: 2024-07-25

## 2024-01-24 DIAGNOSIS — F41.9 ANXIETY: ICD-10-CM

## 2024-01-25 RX ORDER — HYDROXYZINE HYDROCHLORIDE 25 MG/1
TABLET, FILM COATED ORAL
Qty: 90 TABLET | Refills: 1 | Status: SHIPPED | OUTPATIENT
Start: 2024-01-25 | End: 2024-07-25

## 2024-02-17 ENCOUNTER — HEALTH MAINTENANCE LETTER (OUTPATIENT)
Age: 39
End: 2024-02-17

## 2024-05-14 ENCOUNTER — E-VISIT (OUTPATIENT)
Dept: FAMILY MEDICINE | Facility: CLINIC | Age: 39
End: 2024-05-14
Payer: COMMERCIAL

## 2024-05-14 DIAGNOSIS — H93.13 EAR NOISE/BUZZING, BILATERAL: Primary | ICD-10-CM

## 2024-05-14 PROCEDURE — 99207 PR NON-BILLABLE SERV PER CHARTING: CPT | Performed by: PHYSICIAN ASSISTANT

## 2024-05-14 NOTE — PATIENT INSTRUCTIONS
Thank you for choosing us for your care. I think an in-clinic visit would be best next steps based on your symptoms. Please schedule a clinic appointment; you won t be charged for this eVisit.      You can schedule an appointment right here in [facility], or call [phone]

## 2024-07-24 ASSESSMENT — PATIENT HEALTH QUESTIONNAIRE - PHQ9
SUM OF ALL RESPONSES TO PHQ QUESTIONS 1-9: 7
10. IF YOU CHECKED OFF ANY PROBLEMS, HOW DIFFICULT HAVE THESE PROBLEMS MADE IT FOR YOU TO DO YOUR WORK, TAKE CARE OF THINGS AT HOME, OR GET ALONG WITH OTHER PEOPLE: NOT DIFFICULT AT ALL
SUM OF ALL RESPONSES TO PHQ QUESTIONS 1-9: 7

## 2024-07-25 ENCOUNTER — VIRTUAL VISIT (OUTPATIENT)
Dept: FAMILY MEDICINE | Facility: CLINIC | Age: 39
End: 2024-07-25
Payer: COMMERCIAL

## 2024-07-25 DIAGNOSIS — R05.1 ACUTE COUGH: Primary | ICD-10-CM

## 2024-07-25 PROCEDURE — 99214 OFFICE O/P EST MOD 30 MIN: CPT | Mod: 95 | Performed by: NURSE PRACTITIONER

## 2024-07-25 ASSESSMENT — ENCOUNTER SYMPTOMS: COUGH: 1

## 2024-07-25 NOTE — PROGRESS NOTES
Angelo is a 39 year old who is being evaluated via a billable video visit.    How would you like to obtain your AVS? MyChart  If the video visit is dropped, the invitation should be resent by: Text to cell phone: [phone]  Will anyone else be joining your video visit? No      Acute cough  Differentials include postnasal drainage, allergies, GERD, postviral cough, bronchitis, pneumonia, asthma.  Will obtain chest x-ray for further evaluation.  Will start omeprazole 20 mg daily due to frequent heartburn.  Educated on the medication's indications and side effects.  We did discuss also initiating over-the-counter cetirizine to assist with postnasal drainage and underlying allergies.  If no improvement in symptoms, he is to follow-up with his PCP.  He is content with the plan.  - XR Chest 2 Views  - omeprazole (PRILOSEC) 20 MG DR capsule  Dispense: 90 capsule; Refill: 0        Subjective   Angelo is a 39 year old, presenting for the following health issues:    Patient is concerned about cough for 4 weeks.  Patient describes this as nonproductive.  He does not feel postnasal drainage.  He did not have cold symptoms at the onset of the cough.  The cough has been responding to over-the-counter cough syrup and drops.  He has tested negative for COVID.  He denies rhinorrhea, headache, stomachache, nausea, vomiting, shortness of breath, chest tightness, wheezing.  No fever has been present.  He experiences heartburn regularly and takes Tums as needed.  He denies personal or family history of blood clots.  He does not smoke.      Cough (For over 4 weeks)      7/25/2024    11:08 AM   Additional Questions   Roomed by Nia Joy    History of Present Illness       Reason for visit:  Persisten cought (4 weeks)  Symptom onset:  3-4 weeks ago  Symptoms include:  Cough  Symptom intensity:  Moderate  Symptom progression:  Staying the same  Had these symptoms before:  No  What makes it worse:  Cough responds to cough  suppressant and cough drops    He eats 4 or more servings of fruits and vegetables daily.He consumes 1 sweetened beverage(s) daily.He exercises with enough effort to increase his heart rate 10 to 19 minutes per day.  He exercises with enough effort to increase his heart rate 3 or less days per week.   He is taking medications regularly.             Review of Systems  Constitutional, HEENT, cardiovascular, pulmonary, GI, , musculoskeletal, neuro, skin, endocrine and psych systems are negative, except as otherwise noted.      Objective           Vitals:  No vitals were obtained today due to virtual visit.    Physical Exam   GENERAL: alert and no distress  EYES: Eyes grossly normal to inspection.  No discharge or erythema, or obvious scleral/conjunctival abnormalities.  RESP: No audible wheeze, cough, or visible cyanosis.    SKIN: Visible skin clear. No significant rash, abnormal pigmentation or lesions.  NEURO: Cranial nerves grossly intact.  Mentation and speech appropriate for age.  PSYCH: Appropriate affect, tone, and pace of words        Video-Visit Details    Type of service:  Video Visit   Originating Location (pt. Location): Home    Distant Location (provider location):  On-site  Platform used for Video Visit: Gabriel  Signed Electronically by: YESSY Xavier CNP

## 2024-07-26 ENCOUNTER — ANCILLARY PROCEDURE (OUTPATIENT)
Dept: GENERAL RADIOLOGY | Facility: CLINIC | Age: 39
End: 2024-07-26
Attending: NURSE PRACTITIONER
Payer: COMMERCIAL

## 2024-07-26 DIAGNOSIS — R05.1 ACUTE COUGH: ICD-10-CM

## 2024-07-26 PROCEDURE — 71046 X-RAY EXAM CHEST 2 VIEWS: CPT | Mod: TC | Performed by: RADIOLOGY

## 2024-08-16 ENCOUNTER — OFFICE VISIT (OUTPATIENT)
Dept: FAMILY MEDICINE | Facility: CLINIC | Age: 39
End: 2024-08-16
Payer: COMMERCIAL

## 2024-08-16 VITALS
RESPIRATION RATE: 14 BRPM | SYSTOLIC BLOOD PRESSURE: 118 MMHG | BODY MASS INDEX: 34.89 KG/M2 | TEMPERATURE: 98.1 F | WEIGHT: 229.44 LBS | DIASTOLIC BLOOD PRESSURE: 64 MMHG

## 2024-08-16 DIAGNOSIS — H66.002 ACUTE SUPPURATIVE OTITIS MEDIA OF LEFT EAR WITHOUT SPONTANEOUS RUPTURE OF TYMPANIC MEMBRANE, RECURRENCE NOT SPECIFIED: Primary | ICD-10-CM

## 2024-08-16 PROCEDURE — 99213 OFFICE O/P EST LOW 20 MIN: CPT | Performed by: NURSE PRACTITIONER

## 2024-08-16 RX ORDER — AMOXICILLIN 875 MG
875 TABLET ORAL 2 TIMES DAILY
Qty: 14 TABLET | Refills: 0 | Status: SHIPPED | OUTPATIENT
Start: 2024-08-16 | End: 2024-08-23

## 2024-08-16 NOTE — PROGRESS NOTES
Assessment & Plan     Acute suppurative otitis media of left ear without spontaneous rupture of tympanic membrane, recurrence not specified  Exam is consistent with ear infection. Patient does have flight this weekend- recommend patient take flonase to help up sinus cavity and eustachian tube to promote drainage from the ear drum to help prevent rupture.   - amoxicillin (AMOXIL) 875 MG tablet; Take 1 tablet (875 mg) by mouth 2 times daily for 7 days                Subjective   Angelo is a 39 year old, presenting for the following health issues:  Otalgia (LT ear pain and feels clogged, sensitive to the touch, feels as though there may be water in his ear that wants to drain but no drainage. )        8/16/2024    10:55 AM   Additional Questions   Roomed by aa     History of Present Illness       Reason for visit:  Follow up on acute cough - no improvment    He eats 2-3 servings of fruits and vegetables daily.He consumes 2 sweetened beverage(s) daily.He exercises with enough effort to increase his heart rate 30 to 60 minutes per day.  He exercises with enough effort to increase his heart rate 3 or less days per week.   He is taking medications regularly.     Cough for 6 weeks. Chest xrays were normal. Cough has been responsive to cough syrup and cough drops.  Left ear seems clogged-feels like something wants to come out. Has not seen draiange. Has tried little pieces of toilet paper in ear and comes out a little wet.    No history of asthma or other lung concerns     Patient reports he does not think the cough is reflux-he was prescribed prilosec at last visit but not tried .     Acute Illness  Acute illness concerns: LT ear pain  Onset/Duration: 3-4 days  Symptoms:  Fever: No  Chills/Sweats: No  Headache (location?): No  Sinus Pressure: No  Conjunctivitis:  No  Ear Pain: YES- left  Rhinorrhea: No  Congestion: No  Sore Throat: No  Cough: YES  Wheeze: No  Decreased Appetite: No  Nausea: No  Vomiting: No  Diarrhea:  No  Dysuria/Freq.: No  Dysuria or Hematuria: No  Fatigue/Achiness: No  Sick/Strep Exposure: No  Therapies tried and outcome: None            Objective    /64 (BP Location: Right arm, Patient Position: Sitting, Cuff Size: Adult Regular)   Temp 98.1  F (36.7  C) (Oral)   Resp 14   Wt 104.1 kg (229 lb 7 oz)   BMI 34.89 kg/m    Body mass index is 34.89 kg/m .  Physical Exam  Vitals reviewed.   HENT:      Right Ear: Hearing, ear canal and external ear normal. Tympanic membrane is scarred.      Left Ear: Hearing, ear canal and external ear normal. Tympanic membrane is erythematous and bulging (purulent fluid visible).      Nose: Nose normal.      Mouth/Throat:      Mouth: Mucous membranes are moist.      Pharynx: Oropharynx is clear. No posterior oropharyngeal erythema.      Tonsils: No tonsillar exudate.   Cardiovascular:      Rate and Rhythm: Normal rate and regular rhythm.   Pulmonary:      Effort: Pulmonary effort is normal.      Breath sounds: Normal breath sounds.   Neurological:      General: No focal deficit present.      Mental Status: He is alert and oriented to person, place, and time.                    Signed Electronically by: YESSY LNYCH CNP

## 2024-08-27 ENCOUNTER — VIRTUAL VISIT (OUTPATIENT)
Dept: FAMILY MEDICINE | Facility: CLINIC | Age: 39
End: 2024-08-27
Payer: COMMERCIAL

## 2024-08-27 DIAGNOSIS — R05.3 CHRONIC COUGH: Primary | ICD-10-CM

## 2024-08-27 PROCEDURE — 99213 OFFICE O/P EST LOW 20 MIN: CPT | Mod: 95 | Performed by: PHYSICIAN ASSISTANT

## 2024-08-27 PROCEDURE — G2211 COMPLEX E/M VISIT ADD ON: HCPCS | Mod: 95 | Performed by: PHYSICIAN ASSISTANT

## 2024-08-27 RX ORDER — DOXYCYCLINE HYCLATE 100 MG
100 TABLET ORAL 2 TIMES DAILY
Qty: 14 TABLET | Refills: 0 | Status: SHIPPED | OUTPATIENT
Start: 2024-08-27 | End: 2024-09-03

## 2024-08-27 RX ORDER — PREDNISONE 20 MG/1
TABLET ORAL
Qty: 15 TABLET | Refills: 0 | Status: SHIPPED | OUTPATIENT
Start: 2024-08-27 | End: 2024-09-06

## 2024-08-27 NOTE — PROGRESS NOTES
Angelo is a 39 year old who is being evaluated via a billable video visit.    How would you like to obtain your AVS? MyChart  If the video visit is dropped, the invitation should be resent by: Text to cell phone: [phone]  Will anyone else be joining your video visit? No      Assessment & Plan     Chronic cough  Ongoing cough greater than 6 weeks.  Given constraints virtual visit today unable to perform physical exam or obtain chest x-ray imaging.  Recommended follow-up next week in person for further evaluation.  In the meantime: Prednisone taper + doxycycline.  Close follow-up with new or worsening symptoms.  Reviewed safety/side effects of both medication.  He is comfortable with this plan.  - doxycycline hyclate (VIBRA-TABS) 100 MG tablet  Dispense: 14 tablet; Refill: 0  - predniSONE (DELTASONE) 20 MG tablet  Dispense: 15 tablet; Refill: 0      MED REC REQUIRED  Post Medication Reconciliation Status: discharge medications reconciled, continue medications without change      20 minutes spent by me on the date of the encounter doing chart review, review of test results, interpretation of tests, patient visit, and documentation     The longitudinal plan of care for the diagnosis(es)/condition(s) as documented were addressed during this visit. Due to the added complexity in care, I will continue to support Angelo in the subsequent management and with ongoing continuity of care.    Subjective   Angelo is a 39 year old, presenting for the following health issues:  Follow Up (Patient is having a virtual follow up for an acute cough.)    Here today for evaluation of a ongoing cough.  Present now for approximately 6 weeks.  See virtual visit on 7/25/2024 as well as in person visit on 8/16/2024.  Diagnosed at the latter visit with otitis media and prescribed 7 days of amoxicillin.  Ear infection has resolved however cough ongoing.  Dry.  Constant.  Responding well to cough syrups.  No known sick contacts.  No environmental  exposures.  Denies infection due to COVID.  No fever, chills, sweats, nausea, vomiting, diarrhea, blood in the stool.  No history of asthma or COPD.    Review of Systems  Constitutional, neuro, ENT, endocrine, pulmonary, cardiac, gastrointestinal, genitourinary, musculoskeletal, integument and psychiatric systems are negative, except as otherwise noted.      Objective           Vitals:  No vitals were obtained today due to virtual visit.    Physical Exam   GENERAL: alert and no distress  EYES: Eyes grossly normal to inspection.  No discharge or erythema, or obvious scleral/conjunctival abnormalities.  RESP: No audible wheeze, cough, or visible cyanosis.    SKIN: Visible skin clear. No significant rash, abnormal pigmentation or lesions.  NEURO: Cranial nerves grossly intact.  Mentation and speech appropriate for age.  PSYCH: Appropriate affect, tone, and pace of words      Video-Visit Details    Type of service:  Video Visit   Originating Location (pt. Location): Home    Distant Location (provider location):  On-site  Platform used for Video Visit: Gabriel    The likelihood of other entities in the differential is insufficient to justify any further testing for them at this time. This was explained to the patient. The patient was advised that persistent or worsening symptoms would require further evaluation. Patient advised to call the office and if unable to reach to go to the emergency room if they develop any new or worsening symptoms. Expressed understanding and agreement with above stated plan.     Signed Electronically by: Suhas Jimenez PA-C

## 2024-09-03 ENCOUNTER — TELEPHONE (OUTPATIENT)
Dept: FAMILY MEDICINE | Facility: CLINIC | Age: 39
End: 2024-09-03
Payer: COMMERCIAL

## 2024-09-03 NOTE — TELEPHONE ENCOUNTER
Pt called - received doxycycline and prednisone pills     Pt states he thought he was going to get a steroid inhaler rather prednisone but was unsure     Did advise per visit notes prednisone pills and abx were recommended - did reassure this is common combination therapy for symptoms similar to his     Will be following up in person later this week     Appointments in Next Year      Sep 05, 2024 1:00 PM  (Arrive by 12:40 PM)  Provider Visit with Suhas Jimenez PA-C  [facility] ([facility] ) [phone]          Alexus Richter RN  [facility]

## 2024-09-04 ASSESSMENT — PATIENT HEALTH QUESTIONNAIRE - PHQ9: SUM OF ALL RESPONSES TO PHQ QUESTIONS 1-9: 6

## 2024-09-05 ENCOUNTER — ANCILLARY PROCEDURE (OUTPATIENT)
Dept: GENERAL RADIOLOGY | Facility: CLINIC | Age: 39
End: 2024-09-05
Attending: PHYSICIAN ASSISTANT
Payer: COMMERCIAL

## 2024-09-05 ENCOUNTER — OFFICE VISIT (OUTPATIENT)
Dept: FAMILY MEDICINE | Facility: CLINIC | Age: 39
End: 2024-09-05
Payer: COMMERCIAL

## 2024-09-05 VITALS
HEART RATE: 77 BPM | WEIGHT: 227.8 LBS | SYSTOLIC BLOOD PRESSURE: 122 MMHG | OXYGEN SATURATION: 96 % | HEIGHT: 68 IN | BODY MASS INDEX: 34.53 KG/M2 | DIASTOLIC BLOOD PRESSURE: 84 MMHG | TEMPERATURE: 97.1 F | RESPIRATION RATE: 18 BRPM

## 2024-09-05 DIAGNOSIS — F33.41 RECURRENT MAJOR DEPRESSIVE DISORDER, IN PARTIAL REMISSION (H): ICD-10-CM

## 2024-09-05 DIAGNOSIS — Z00.00 ANNUAL PHYSICAL EXAM: Primary | ICD-10-CM

## 2024-09-05 DIAGNOSIS — R05.3 CHRONIC COUGH: ICD-10-CM

## 2024-09-05 LAB
ALBUMIN SERPL BCG-MCNC: 4.8 G/DL (ref 3.5–5.2)
ALP SERPL-CCNC: 62 U/L (ref 40–150)
ALT SERPL W P-5'-P-CCNC: 34 U/L (ref 0–70)
ANION GAP SERPL CALCULATED.3IONS-SCNC: 13 MMOL/L (ref 7–15)
AST SERPL W P-5'-P-CCNC: 26 U/L (ref 0–45)
BASOPHILS # BLD AUTO: 0 10E3/UL (ref 0–0.2)
BASOPHILS NFR BLD AUTO: 0 %
BILIRUB SERPL-MCNC: 0.3 MG/DL
BUN SERPL-MCNC: 14.2 MG/DL (ref 6–20)
CALCIUM SERPL-MCNC: 9.7 MG/DL (ref 8.8–10.4)
CHLORIDE SERPL-SCNC: 105 MMOL/L (ref 98–107)
CREAT SERPL-MCNC: 0.95 MG/DL (ref 0.67–1.17)
EGFRCR SERPLBLD CKD-EPI 2021: >90 ML/MIN/1.73M2
EOSINOPHIL # BLD AUTO: 0 10E3/UL (ref 0–0.7)
EOSINOPHIL NFR BLD AUTO: 0 %
ERYTHROCYTE [DISTWIDTH] IN BLOOD BY AUTOMATED COUNT: 12.8 % (ref 10–15)
GLUCOSE SERPL-MCNC: 125 MG/DL (ref 70–99)
HBA1C MFR BLD: 5.8 % (ref 0–5.6)
HCO3 SERPL-SCNC: 23 MMOL/L (ref 22–29)
HCT VFR BLD AUTO: 47.3 % (ref 40–53)
HGB BLD-MCNC: 15.5 G/DL (ref 13.3–17.7)
IMM GRANULOCYTES # BLD: 0.1 10E3/UL
IMM GRANULOCYTES NFR BLD: 1 %
LYMPHOCYTES # BLD AUTO: 1.1 10E3/UL (ref 0.8–5.3)
LYMPHOCYTES NFR BLD AUTO: 14 %
MCH RBC QN AUTO: 27.4 PG (ref 26.5–33)
MCHC RBC AUTO-ENTMCNC: 32.8 G/DL (ref 31.5–36.5)
MCV RBC AUTO: 84 FL (ref 78–100)
MONOCYTES # BLD AUTO: 0.2 10E3/UL (ref 0–1.3)
MONOCYTES NFR BLD AUTO: 3 %
NEUTROPHILS # BLD AUTO: 6.2 10E3/UL (ref 1.6–8.3)
NEUTROPHILS NFR BLD AUTO: 82 %
PLATELET # BLD AUTO: 433 10E3/UL (ref 150–450)
POTASSIUM SERPL-SCNC: 4.7 MMOL/L (ref 3.4–5.3)
PROT SERPL-MCNC: 8 G/DL (ref 6.4–8.3)
RBC # BLD AUTO: 5.65 10E6/UL (ref 4.4–5.9)
SODIUM SERPL-SCNC: 141 MMOL/L (ref 135–145)
WBC # BLD AUTO: 7.6 10E3/UL (ref 4–11)

## 2024-09-05 PROCEDURE — 71046 X-RAY EXAM CHEST 2 VIEWS: CPT | Mod: TC | Performed by: RADIOLOGY

## 2024-09-05 PROCEDURE — 80053 COMPREHEN METABOLIC PANEL: CPT | Performed by: PHYSICIAN ASSISTANT

## 2024-09-05 PROCEDURE — 83036 HEMOGLOBIN GLYCOSYLATED A1C: CPT | Performed by: PHYSICIAN ASSISTANT

## 2024-09-05 PROCEDURE — 36415 COLL VENOUS BLD VENIPUNCTURE: CPT | Performed by: PHYSICIAN ASSISTANT

## 2024-09-05 PROCEDURE — 85025 COMPLETE CBC W/AUTO DIFF WBC: CPT | Performed by: PHYSICIAN ASSISTANT

## 2024-09-05 PROCEDURE — 99395 PREV VISIT EST AGE 18-39: CPT | Performed by: PHYSICIAN ASSISTANT

## 2024-09-05 ASSESSMENT — PAIN SCALES - GENERAL: PAINLEVEL: NO PAIN (0)

## 2024-09-05 ASSESSMENT — PATIENT HEALTH QUESTIONNAIRE - PHQ9
10. IF YOU CHECKED OFF ANY PROBLEMS, HOW DIFFICULT HAVE THESE PROBLEMS MADE IT FOR YOU TO DO YOUR WORK, TAKE CARE OF THINGS AT HOME, OR GET ALONG WITH OTHER PEOPLE: SOMEWHAT DIFFICULT
SUM OF ALL RESPONSES TO PHQ QUESTIONS 1-9: 6

## 2024-09-05 NOTE — PATIENT INSTRUCTIONS
-Restart Claritin daily and add Flonase  -Restart Omeprazole for GERD  -Repeat CXR  -Allergy/asthma   This document is complete and the patient is ready for discharge.

## 2024-09-05 NOTE — PROGRESS NOTES
"Preventive Care Visit  [facility] JAREN Jimenez PA-C, Physician Assistant - Medical  Sep 5, 2024      Assessment & Plan     Annual physical exam  Annual labs ordered. Healthy diet and exercise reviewed. Recommended routine dental, eye, and skin screenings.     - CBC with platelets and differential  - Comprehensive metabolic panel (BMP + Alb, Alk Phos, ALT, AST, Total. Bili, TP)  - Hemoglobin A1c    Chronic cough  Finish prednisone/doxycycline.  Restart Claritin and add Flonase nasal spray.  Add omeprazole 20 mg daily.  Repeat chest x-ray today.  Allergy/asthma evaluation.  Could consider PFTs in future.    - Adult Allergy/Asthma  Referral  - XR Chest 2 Views    Recurrent major depressive disorder, in partial remission (H24)  Stable without medications.      Patient has been advised of split billing requirements and indicates understanding: Yes        BMI  Estimated body mass index is 34.64 kg/m  as calculated from the following:    Height as of this encounter: 1.727 m (5' 8\").    Weight as of this encounter: 103.3 kg (227 lb 12.8 oz).   Weight management plan: Discussed healthy diet and exercise guidelines    Counseling  Appropriate preventive services were addressed with this patient via screening, questionnaire, or discussion as appropriate for fall prevention, nutrition, physical activity, Tobacco-use cessation, social engagement, weight loss and cognition.  Checklist reviewing preventive services available has been given to the patient.  Reviewed patient's diet, addressing concerns and/or questions.   He is at risk for lack of exercise and has been provided with information to increase physical activity for the benefit of his well-being.   He is at risk for psychosocial distress and has been provided with information to reduce risk.   The patient's PHQ-9 score is consistent with mild depression. He was provided with information regarding depression.         Grayson Stephens " is a 39 year old, presenting for the following:  Physical       Here today for his annual physical as well as follow-up in regards to ongoing cough.    Cough now present greater than 6 weeks.  No fever, chills, sweats, nausea, vomiting, diarrhea, abdominal pain.  Completed amoxicillin during that time his cough was present for a left ear infection which has resolved.  Currently on prednisone and doxycycline.  Finishing these.  Notes no change in his cough.  At one point tried Claritin for a week and was not helpful.  Has a little bit of postnasal drip which is new over the past week.    Off Effexor.  Mood stable.  Staying active with swimming.      Health Care Directive  Patient does not have a Health Care Directive or Living Will: Discussed advance care planning with patient; however, patient declined at this time.          9/5/2024   General Health   How would you rate your overall physical health? (!) FAIR   Feel stress (tense, anxious, or unable to sleep) Only a little      (!) STRESS CONCERN      9/5/2024   Nutrition   Three or more servings of calcium each day? Yes   Diet: Other   If other, please elaborate: light flour and sugar   How many servings of fruit and vegetables per day? (!) 2-3   How many sweetened beverages each day? 0-1            9/5/2024   Exercise   Days per week of moderate/strenous exercise 3 days   Average minutes spent exercising at this level 30 min            9/5/2024   Social Factors   Frequency of gathering with friends or relatives Once a week   Worry food won't last until get money to buy more No   Food not last or not have enough money for food? No   Do you have housing? (Housing is defined as stable permanent housing and does not include staying ouside in a car, in a tent, in an abandoned building, in an overnight shelter, or couch-surfing.) Yes   Are you worried about losing your housing? No   Lack of transportation? No   Unable to get utilities (heat,electricity)? No             9/5/2024   Dental   Dentist two times every year? Yes            9/5/2024   TB Screening   Were you born outside of the [place]? No          Today's PHQ-9 Score:       9/4/2024     1:54 PM   PHQ-9 SCORE   PHQ-9 Total Score MyChart 6 (Mild depression)   PHQ-9 Total Score 6         9/5/2024   Substance Use   Alcohol more than 3/day or more than 7/wk No   Do you use any other substances recreationally? No        Social History     Tobacco Use    Smoking status: Never     Passive exposure: Never    Smokeless tobacco: Never   Vaping Use    Vaping status: Never Used   Substance Use Topics    Alcohol use: Yes     Comment: 1-2 times a month    Drug use: No           9/5/2024   STI Screening   New sexual partner(s) since last STI/HIV test? (!) YES            9/5/2024   Contraception/Family Planning   Questions about contraception or family planning No           Reviewed and updated as needed this visit by Provider   Tobacco  Allergies  Meds   Med Hx  Surg Hx             Past Medical History:   Diagnosis Date    Anxiety     CARDIOVASCULAR SCREENING; LDL GOAL LESS THAN 160 04/19/2011    Corneal abrasion 2019    Depressive disorder 2011    On [email or web address]    Encounter for screening for cardiovascular disorders 04/19/2011    Family history of hyperthyroidism 03/12/2012    GERD (gastroesophageal reflux disease) 08/16/2013    Glaucoma     Glaucoma     [employer]      History of colonic polyps     PTSD (post-traumatic stress disorder) 11/2010    [employer] volunteer in [facility].    Sweat, sweating, excessive 03/12/2012     Past Surgical History:   Procedure Laterality Date    COLONOSCOPY N/A 2/1/2021    Procedure: COLONOSCOPY, WITH POLYPECTOMY AND BIOPSY;  Surgeon: John Olivares MD;  Location:  GI    MYRINGOTOMY, INSERT TUBE BILATERAL, COMBINED       Lab work is in process  Labs reviewed in EPIC  BP Readings from Last 3 Encounters:   09/05/24 122/84   08/16/24 118/64   01/05/23 105/62    Wt Readings from Last 3  Encounters:   09/05/24 103.3 kg (227 lb 12.8 oz)   08/16/24 104.1 kg (229 lb 7 oz)   01/05/23 99.8 kg (220 lb)                  Patient Active Problem List   Diagnosis    Anxiety    Mild major depression (H)     Past Surgical History:   Procedure Laterality Date    COLONOSCOPY N/A 2/1/2021    Procedure: COLONOSCOPY, WITH POLYPECTOMY AND BIOPSY;  Surgeon: John Olivares MD;  Location:  GI    MYRINGOTOMY, INSERT TUBE BILATERAL, COMBINED         Social History     Tobacco Use    Smoking status: Never     Passive exposure: Never    Smokeless tobacco: Never   Substance Use Topics    Alcohol use: Yes     Comment: 1-2 times a month     Family History   Problem Relation Age of Onset    Depression Mother     Anxiety Disorder Mother     Breast Cancer Mother     Depression Father     Anxiety Disorder Father     Substance Abuse Father         Alcoholic    Parkinsonism Maternal Grandmother         diagnosed in her 40's    Diabetes Maternal Grandmother     Other Cancer Maternal Grandmother     Genetic Disorder Maternal Grandmother         Parkinson's Disease    Parkinsonism Maternal Grandfather         diagnosed in his 40's    Hypertension Maternal Grandfather     Genetic Disorder Maternal Grandfather         Parkinson's Disease    Melanoma Maternal Grandfather 60    Dementia Maternal Grandfather     Dementia Paternal Grandfather          Current Outpatient Medications   Medication Sig Dispense Refill    multivitamin w/minerals (THERA-VIT-M) tablet Take 1 tablet by mouth daily      predniSONE (DELTASONE) 20 MG tablet Take 2 tablets (40 mg) by mouth daily for 5 days, THEN 1 tablet (20 mg) daily for 5 days. 15 tablet 0    omeprazole (PRILOSEC) 20 MG DR capsule Take 1 capsule (20 mg) by mouth daily (Patient not taking: Reported on 8/16/2024) 90 capsule 0     No Known Allergies      Review of Systems  Constitutional, neuro, ENT, endocrine, pulmonary, cardiac, gastrointestinal, genitourinary, musculoskeletal, integument and  "psychiatric systems are negative, except as otherwise noted.     Objective    Exam  /84 (BP Location: Right arm, Patient Position: Sitting, Cuff Size: Adult Large)   Pulse 77   Temp 97.1  F (36.2  C) (Temporal)   Resp 18   Ht 1.727 m (5' 8\")   Wt 103.3 kg (227 lb 12.8 oz)   SpO2 96%   BMI 34.64 kg/m     Estimated body mass index is 34.64 kg/m  as calculated from the following:    Height as of this encounter: 1.727 m (5' 8\").    Weight as of this encounter: 103.3 kg (227 lb 12.8 oz).    Physical Exam  GENERAL: alert and no distress  EYES: Eyes grossly normal to inspection, PERRL and conjunctivae and sclerae normal  HENT: ear canals and TM's normal, nose and mouth without ulcers or lesions  NECK: no adenopathy, no asymmetry, masses, or scars  RESP: lungs clear to auscultation - no rales, rhonchi or wheezes  CV: regular rate and rhythm, normal S1 S2, no S3 or S4, no murmur, click or rub, no peripheral edema  ABDOMEN: soft, nontender, no hepatosplenomegaly, no masses and bowel sounds normal  MS: no gross musculoskeletal defects noted, no edema  SKIN: no suspicious lesions or rashes  NEURO: Normal strength and tone, mentation intact and speech normal  PSYCH: mentation appears normal, affect normal/bright    The likelihood of other entities in the differential is insufficient to justify any further testing for them at this time. This was explained to the patient. The patient was advised that persistent or worsening symptoms would require further evaluation. Patient advised to call the office and if unable to reach to go to the emergency room if they develop any new or worsening symptoms. Expressed understanding and agreement with above stated plan.       Signed Electronically by: Suhas Jimenez PA-C    Answers submitted by the patient for this visit:  Patient Health Questionnaire (Submitted on 9/5/2024)  If you checked off any problems, how difficult have these problems made it for you to do your work, " take care of things at home, or get along with other people?: Somewhat difficult  PHQ9 TOTAL SCORE: 6  General Questionnaire (Submitted on 9/5/2024)  Chief Complaint: Chronic problems general questions HPI Form  What is the reason for your visit today? : Acute cough  How many servings of fruits and vegetables do you eat daily?: 2-3  On average, how many sweetened beverages do you drink each day (Examples: soda, juice, sweet tea, etc.  Do NOT count diet or artificially sweetened beverages)?: 1  How many minutes a day do you exercise enough to make your heart beat faster?: 30 to 60  How many days a week do you exercise enough to make your heart beat faster?: 4  How many days per week do you miss taking your medication?: 0

## 2024-09-06 NOTE — RESULT ENCOUNTER NOTE
Ramón Stephens,     Still waiting on the chest x-ray results.  Otherwise blood counts are stable.  Electrolytes, kidney function, and liver function are normal.  However, your average blood sugar (hemoglobin A1c) came back elevated.  Previously this was 5.5% from a few years ago.  Now increased to 5.8% which is technically the prediabetic range.    Prediabetes is a condition in which blood sugar levels are higher than normal but not high enough to be diagnosed as diabetes. If left untreated, prediabetes can progress to type 2 diabetes, a serious condition that can lead to many health problems.      The most effective way to prevent prediabetes from progressing to diabetes is to make healthy lifestyle changes. This includes eating a healthy diet that is low in sugar and starches (carbohydrates).  It is particularly important to avoid foods that contain added sugars such as high fructose corn syrup.  Soda pop, juices, sport drinks, candies and sweets commonly contain added sugars.     In addition to changing your diet, increasing physical activity can prevent progression to diabetes. Regular exercise can help improve insulin sensitivity and reduce blood sugar levels. Aim for at 150 minutes of moderate-intensity physical activity each week.  This could include brisk walking, cycling, or swimming.    Losing weight is an effective way to prevent prediabetes from progressing to diabetes. Even a modest weight loss of 5-10% of you current body weight can make a significant difference in reducing your risk of developing type 2 diabetes.    A combination of healthy eating and regular exercise can help you lose weight and reduce your risk of developing type 2 diabetes.    Let me know if you have any questions or concerns,     TONY Almaguer [facility]

## 2024-09-06 NOTE — RESULT ENCOUNTER NOTE
Ramón Stephens,     Chest Xray negative. Stick to plan from yesterday.     Let me know if you have any questions or concerns,     Suhas Jimenez PA-C  [facility]

## 2024-09-25 DIAGNOSIS — R05.1 ACUTE COUGH: ICD-10-CM

## 2024-10-05 ENCOUNTER — IMMUNIZATION (OUTPATIENT)
Dept: FAMILY MEDICINE | Facility: CLINIC | Age: 39
End: 2024-10-05
Payer: COMMERCIAL

## 2024-10-05 PROCEDURE — 91320 SARSCV2 VAC 30MCG TRS-SUC IM: CPT

## 2024-10-05 PROCEDURE — 90480 ADMN SARSCOV2 VAC 1/ONLY CMP: CPT

## 2024-10-26 ASSESSMENT — PATIENT HEALTH QUESTIONNAIRE - PHQ9: SUM OF ALL RESPONSES TO PHQ QUESTIONS 1-9: 14

## 2025-08-06 ENCOUNTER — PATIENT OUTREACH (OUTPATIENT)
Dept: CARE COORDINATION | Facility: CLINIC | Age: 40
End: 2025-08-06
Payer: COMMERCIAL

## (undated) RX ORDER — FENTANYL CITRATE 50 UG/ML
INJECTION, SOLUTION INTRAMUSCULAR; INTRAVENOUS
Status: DISPENSED
Start: 2021-02-01